# Patient Record
Sex: MALE | Race: WHITE | NOT HISPANIC OR LATINO | Employment: FULL TIME | URBAN - METROPOLITAN AREA
[De-identification: names, ages, dates, MRNs, and addresses within clinical notes are randomized per-mention and may not be internally consistent; named-entity substitution may affect disease eponyms.]

---

## 2020-01-28 ENCOUNTER — OFFICE VISIT (OUTPATIENT)
Dept: FAMILY MEDICINE CLINIC | Facility: CLINIC | Age: 25
End: 2020-01-28

## 2020-01-28 VITALS
SYSTOLIC BLOOD PRESSURE: 130 MMHG | DIASTOLIC BLOOD PRESSURE: 84 MMHG | HEART RATE: 87 BPM | OXYGEN SATURATION: 98 % | HEIGHT: 69 IN | WEIGHT: 189 LBS | BODY MASS INDEX: 27.99 KG/M2

## 2020-01-28 DIAGNOSIS — Q23.1 BICUSPID AORTIC VALVE: ICD-10-CM

## 2020-01-28 DIAGNOSIS — Z00.00 PREVENTATIVE HEALTH CARE: ICD-10-CM

## 2020-01-28 DIAGNOSIS — F32.A DEPRESSION, UNSPECIFIED DEPRESSION TYPE: Primary | ICD-10-CM

## 2020-01-28 PROBLEM — Q23.81 BICUSPID AORTIC VALVE: Status: ACTIVE | Noted: 2020-01-28

## 2020-01-28 PROCEDURE — 99203 OFFICE O/P NEW LOW 30 MIN: CPT | Performed by: FAMILY MEDICINE

## 2020-01-28 RX ORDER — BUPROPION HYDROCHLORIDE 150 MG/1
150 TABLET ORAL DAILY
Qty: 30 TABLET | Refills: 2 | OUTPATIENT
Start: 2020-01-28 | End: 2021-04-13

## 2020-01-28 NOTE — PROGRESS NOTES
"Subjective   Augustin Frederick is a 24 y.o. male.     Chief Complaint   Patient presents with   • Establish Care     anxiety, depression        History of Present Illness     Acute complaints:  Augustin Frederick is a 24 y.o. male who presents today to establish care.  He has acute concern for \"mental health issues.\" He reports difficulty with emotions for about 10 years or so. It sometimes causes problems within relationships. He has noticed decreased energy last couple years. Been at [employer] for about 3 years, does not feel like it has affected his work. He typically sleeps 8:30am-4:30am. Lives with gf of 11 years and two kids daughter 5 son 4. When he was a lot younger he was on something for ADHD. Social alcohol use if ever, nonsmoker. Exercise is limited to work, he used to run more but has quit some over the years due to left ankle. Diet is varied but poor.    This patient is accompanied by their self who contributes to the history of their care.    The following portions of the patient's history were reviewed and updated as appropriate: allergies, current medications, past family history, past medical history, past social history, past surgical history and problem list.    Active Ambulatory Problems     Diagnosis Date Noted   • Bicuspid aortic valve 01/28/2020   • Depression 01/28/2020     Resolved Ambulatory Problems     Diagnosis Date Noted   • No Resolved Ambulatory Problems     Past Medical History:   Diagnosis Date   • Aortic valve, bicuspid      Past Surgical History:   Procedure Laterality Date   • ANKLE SURGERY     • LEG SURGERY       Family History   Problem Relation Age of Onset   • Heart disease Father    • Heart disease Paternal Grandfather      Social History     Socioeconomic History   • Marital status: Single     Spouse name: Not on file   • Number of children: Not on file   • Years of education: Not on file   • Highest education level: Not on file   Tobacco Use   • Smoking status: Never " "Smoker   • Smokeless tobacco: Never Used   Substance and Sexual Activity   • Alcohol use: Yes     Comment: occasionally    • Drug use: No   • Sexual activity: Defer       Review of Systems  See new patient paperwork scanned into chart  General ROS: negative for - chills, fever or night sweats  Cardiovascular ROS: no chest pain or dyspnea on exertion  Gastrointestinal ROS: no abdominal pain, change in bowel habits, or black or bloody stools  Genito-Urinary ROS: no trouble voiding or gross hematuria    Objective   Blood pressure 130/84, pulse 87, height 175.3 cm (69\"), weight 85.7 kg (189 lb), SpO2 98 %.  Nursing note reviewed  Physical Exam  Const: NAD, A&Ox4, Pleasant, Cooperative  Eyes: EOMI, no conjunctivitis  ENT: No nasal discharge present, neck supple  Cardiac: Regular rate and rhythm, no cyanosis  Resp: Respiratory rate within normal limits, no increased work of breathing, no audible wheezing or retractions noted  GI: No distention or ascites  MSK: Motor and sensation grossly intact in bilateral upper extremities  Neurologic: CN II-XII grossly intact  Psych: Appropriate mood and behavior.  Skin: Warm, dry  PHQ-9 Depression Screening  Little interest or pleasure in doing things? 3   Feeling down, depressed, or hopeless? 1   Trouble falling or staying asleep, or sleeping too much? 2   Feeling tired or having little energy? 3   Poor appetite or overeating? 3   Feeling bad about yourself - or that you are a failure or have let yourself or your family down? 0   Trouble concentrating on things, such as reading the newspaper or watching television? 0   Moving or speaking so slowly that other people could have noticed? Or the opposite - being so fidgety or restless that you have been moving around a lot more than usual? 0   Thoughts that you would be better off dead, or of hurting yourself in some way? 0   PHQ-9 Total Score 12   If you checked off any problems, how difficult have these problems made it for you to do " your work, take care of things at home, or get along with other people? Somewhat difficult     Procedures  Assessment/Plan   Problem List Items Addressed This Visit        Cardiovascular and Mediastinum    Bicuspid aortic valve       Other    Depression - Primary    Overview     PHQ 12, somewhat difficult.  We will check labs today, if no underlying organic cause rec Wellbutrin XL 150mg & would recommend psych, exercise, diet.         Relevant Medications    buPROPion XL (WELLBUTRIN XL) 150 MG 24 hr tablet    Other Relevant Orders    Testosterone    TSH Rfx On Abnormal To Free T4    Vitamin D 25 Hydroxy    Vitamin B12    CBC (No Diff)    Comprehensive Metabolic Panel    Hemoglobin A1c    Lipid Panel    Ambulatory Referral to Psychology      Other Visit Diagnoses     Preventative health care        Relevant Orders    Testosterone    TSH Rfx On Abnormal To Free T4    Vitamin D 25 Hydroxy    Vitamin B12    CBC (No Diff)    Comprehensive Metabolic Panel    Hemoglobin A1c    Lipid Panel          See patient diagnoses and orders along with patient instructions for assessment, plan, and changes to care for patient.    We will plan to obtain previous records both for chronic preventative care as well as those related to the current episode of care.  Any records that the patient brought with him today were reviewed personally by me during the visit today and will be scanned into the chart for posterity.    There are no Patient Instructions on file for this visit.    Return in about 2 weeks (around 2/11/2020) for (with PHQ9);, Annual.    Ambulatory progress note signed and attested to by Donny Arita D.O.

## 2020-02-04 ENCOUNTER — LAB (OUTPATIENT)
Dept: LAB | Facility: HOSPITAL | Age: 25
End: 2020-02-04

## 2020-02-04 DIAGNOSIS — F32.A DEPRESSION, UNSPECIFIED DEPRESSION TYPE: ICD-10-CM

## 2020-02-04 DIAGNOSIS — Z00.00 PREVENTATIVE HEALTH CARE: ICD-10-CM

## 2020-02-04 LAB
25(OH)D3 SERPL-MCNC: 23.4 NG/ML (ref 30–100)
ALBUMIN SERPL-MCNC: 5.1 G/DL (ref 3.5–5.2)
ALBUMIN/GLOB SERPL: 2.1 G/DL
ALP SERPL-CCNC: 52 U/L (ref 39–117)
ALT SERPL W P-5'-P-CCNC: 22 U/L (ref 1–41)
ANION GAP SERPL CALCULATED.3IONS-SCNC: 14.4 MMOL/L (ref 5–15)
AST SERPL-CCNC: 25 U/L (ref 1–40)
BILIRUB SERPL-MCNC: 0.2 MG/DL (ref 0.2–1.2)
BUN BLD-MCNC: 12 MG/DL (ref 6–20)
BUN/CREAT SERPL: 12.2 (ref 7–25)
CALCIUM SPEC-SCNC: 9.9 MG/DL (ref 8.6–10.5)
CHLORIDE SERPL-SCNC: 100 MMOL/L (ref 98–107)
CHOLEST SERPL-MCNC: 162 MG/DL (ref 0–200)
CO2 SERPL-SCNC: 25.6 MMOL/L (ref 22–29)
CREAT BLD-MCNC: 0.98 MG/DL (ref 0.76–1.27)
DEPRECATED RDW RBC AUTO: 40.8 FL (ref 37–54)
ERYTHROCYTE [DISTWIDTH] IN BLOOD BY AUTOMATED COUNT: 12.5 % (ref 12.3–15.4)
GFR SERPL CREATININE-BSD FRML MDRD: 94 ML/MIN/1.73
GLOBULIN UR ELPH-MCNC: 2.4 GM/DL
GLUCOSE BLD-MCNC: 84 MG/DL (ref 65–99)
HBA1C MFR BLD: 5.1 % (ref 4.8–5.6)
HCT VFR BLD AUTO: 42.6 % (ref 37.5–51)
HDLC SERPL-MCNC: 30 MG/DL (ref 40–60)
HGB BLD-MCNC: 15.1 G/DL (ref 13–17.7)
LDLC SERPL CALC-MCNC: 78 MG/DL (ref 0–100)
LDLC/HDLC SERPL: 2.59 {RATIO}
MCH RBC QN AUTO: 31.7 PG (ref 26.6–33)
MCHC RBC AUTO-ENTMCNC: 35.4 G/DL (ref 31.5–35.7)
MCV RBC AUTO: 89.5 FL (ref 79–97)
PLATELET # BLD AUTO: 201 10*3/MM3 (ref 140–450)
PMV BLD AUTO: 10.7 FL (ref 6–12)
POTASSIUM BLD-SCNC: 4.2 MMOL/L (ref 3.5–5.2)
PROT SERPL-MCNC: 7.5 G/DL (ref 6–8.5)
RBC # BLD AUTO: 4.76 10*6/MM3 (ref 4.14–5.8)
SODIUM BLD-SCNC: 140 MMOL/L (ref 136–145)
TRIGL SERPL-MCNC: 272 MG/DL (ref 0–150)
TSH SERPL DL<=0.05 MIU/L-ACNC: 1.41 UIU/ML (ref 0.27–4.2)
VIT B12 BLD-MCNC: 365 PG/ML (ref 211–946)
VLDLC SERPL-MCNC: 54.4 MG/DL (ref 5–40)
WBC NRBC COR # BLD: 6.21 10*3/MM3 (ref 3.4–10.8)

## 2020-02-04 PROCEDURE — 83036 HEMOGLOBIN GLYCOSYLATED A1C: CPT

## 2020-02-04 PROCEDURE — 82306 VITAMIN D 25 HYDROXY: CPT

## 2020-02-04 PROCEDURE — 84403 ASSAY OF TOTAL TESTOSTERONE: CPT

## 2020-02-04 PROCEDURE — 85027 COMPLETE CBC AUTOMATED: CPT

## 2020-02-04 PROCEDURE — 84443 ASSAY THYROID STIM HORMONE: CPT

## 2020-02-04 PROCEDURE — 80061 LIPID PANEL: CPT

## 2020-02-04 PROCEDURE — 80053 COMPREHEN METABOLIC PANEL: CPT

## 2020-02-04 PROCEDURE — 82607 VITAMIN B-12: CPT

## 2020-02-05 LAB — TESTOST SERPL-MCNC: 470 NG/DL (ref 249–836)

## 2020-02-11 ENCOUNTER — OFFICE VISIT (OUTPATIENT)
Dept: FAMILY MEDICINE CLINIC | Facility: CLINIC | Age: 25
End: 2020-02-11

## 2020-02-11 VITALS
SYSTOLIC BLOOD PRESSURE: 130 MMHG | HEART RATE: 90 BPM | OXYGEN SATURATION: 99 % | HEIGHT: 69 IN | WEIGHT: 194 LBS | DIASTOLIC BLOOD PRESSURE: 90 MMHG | BODY MASS INDEX: 28.73 KG/M2

## 2020-02-11 DIAGNOSIS — Z00.00 PREVENTATIVE HEALTH CARE: Primary | ICD-10-CM

## 2020-02-11 DIAGNOSIS — F32.A DEPRESSION, UNSPECIFIED DEPRESSION TYPE: ICD-10-CM

## 2020-02-11 DIAGNOSIS — E78.2 MODERATE MIXED HYPERLIPIDEMIA NOT REQUIRING STATIN THERAPY: ICD-10-CM

## 2020-02-11 DIAGNOSIS — E55.9 VITAMIN D DEFICIENCY: ICD-10-CM

## 2020-02-11 PROCEDURE — 99395 PREV VISIT EST AGE 18-39: CPT | Performed by: FAMILY MEDICINE

## 2020-02-11 RX ORDER — VITAMIN B COMPLEX
1000 TABLET ORAL DAILY
Qty: 90 TABLET | Refills: 3 | Status: SHIPPED | OUTPATIENT
Start: 2020-02-11 | End: 2022-09-29 | Stop reason: SDUPTHER

## 2020-02-11 RX ORDER — CHLORAL HYDRATE 500 MG
1000 CAPSULE ORAL
Qty: 90 CAPSULE | Refills: 3 | Status: SHIPPED | OUTPATIENT
Start: 2020-02-11 | End: 2022-09-29 | Stop reason: SDUPTHER

## 2020-02-11 NOTE — PROGRESS NOTES
"Subjective   Augustin Frederick is a 24 y.o. male.     Chief Complaint   Patient presents with   • Annual Exam       History of Present Illness     Acute complaints:  Augustin Frederick is a 24 y.o. male who presents today to establish care.  He has acute concern for \"mental health issues.\" He reports difficulty with emotions for about 10 years or so. It sometimes causes problems within relationships. He has noticed decreased energy last couple years. Been at [employer] for about 3 years, does not feel like it has affected his work. He typically sleeps 8:30am-4:30am. Lives with gf of 11 years and two kids daughter 5 son 4. When he was a lot younger he was on something for ADHD. Social alcohol use if ever, nonsmoker. Exercise is limited to work, he used to run more but has quit some over the years due to left ankle. Diet is varied but poor.    This patient is accompanied by their self who contributes to the history of their care.    The following portions of the patient's history were reviewed and updated as appropriate: allergies, current medications, past family history, past medical history, past social history, past surgical history and problem list.    Active Ambulatory Problems     Diagnosis Date Noted   • Bicuspid aortic valve 01/28/2020   • Depression 01/28/2020     Resolved Ambulatory Problems     Diagnosis Date Noted   • No Resolved Ambulatory Problems     Past Medical History:   Diagnosis Date   • Aortic valve, bicuspid      Past Surgical History:   Procedure Laterality Date   • ANKLE SURGERY     • LEG SURGERY       Family History   Problem Relation Age of Onset   • Heart disease Father    • Heart disease Paternal Grandfather      Social History     Socioeconomic History   • Marital status: Single     Spouse name: Not on file   • Number of children: Not on file   • Years of education: Not on file   • Highest education level: Not on file   Tobacco Use   • Smoking status: Never Smoker   • Smokeless " "tobacco: Never Used   Substance and Sexual Activity   • Alcohol use: Yes     Comment: occasionally    • Drug use: No   • Sexual activity: Defer       Review of Systems  See new patient paperwork scanned into chart  General ROS: negative for - chills, fever or night sweats  Cardiovascular ROS: no chest pain or dyspnea on exertion  Gastrointestinal ROS: no abdominal pain, change in bowel habits, or black or bloody stools  Genito-Urinary ROS: no trouble voiding or gross hematuria    Objective   Blood pressure 130/90, pulse 90, height 175.3 cm (69.02\"), weight 88 kg (194 lb), SpO2 99 %.  Nursing note reviewed  Physical Exam  Const: NAD, A&Ox4, Pleasant, Cooperative  Eyes: EOMI, no conjunctivitis  ENT: No nasal discharge present, neck supple  Cardiac: Regular rate and rhythm, no cyanosis  Resp: Respiratory rate within normal limits, no increased work of breathing, no audible wheezing or retractions noted  GI: No distention or ascites  MSK: Motor and sensation grossly intact in bilateral upper extremities  Neurologic: CN II-XII grossly intact  Psych: Appropriate mood and behavior.  Skin: Warm, dry  PHQ-9 Depression Screening  Little interest or pleasure in doing things? 1   Feeling down, depressed, or hopeless? 0   Trouble falling or staying asleep, or sleeping too much? 1   Feeling tired or having little energy? 3   Poor appetite or overeating? 1   Feeling bad about yourself - or that you are a failure or have let yourself or your family down? 0   Trouble concentrating on things, such as reading the newspaper or watching television? 0   Moving or speaking so slowly that other people could have noticed? Or the opposite - being so fidgety or restless that you have been moving around a lot more than usual? 0   Thoughts that you would be better off dead, or of hurting yourself in some way? 0   PHQ-9 Total Score 6   If you checked off any problems, how difficult have these problems made it for you to do your work, take care " of things at home, or get along with other people? Not difficult at all     Procedures  Assessment/Plan   Problem List Items Addressed This Visit        Other    Depression    Overview     PHQ 12, somewhat difficult.  We will check labs today, if no underlying organic cause rec Wellbutrin XL 150mg & would recommend psych, exercise, diet.           Other Visit Diagnoses     Preventative health care    -  Primary    Moderate mixed hyperlipidemia not requiring statin therapy        Relevant Medications    Omega-3 Fatty Acids (FISH OIL) 1000 MG capsule capsule    Other Relevant Orders    Lipid Panel    Comprehensive Metabolic Panel    Vitamin D deficiency        Relevant Medications    Cholecalciferol (VITAMIN D) 25 MCG (1000 UT) tablet    Other Relevant Orders    Vitamin D 25 Hydroxy          See patient diagnoses and orders along with patient instructions for assessment, plan, and changes to care for patient.    The preventative exam has been reviewed in detail.  The patient has been fully counseled on preventative guidelines for vaccines, cancer screenings, and other health maintenance needs. The patient was counseled on maintaining a lifestyle to promote good health and to minimize chronic diseases.  The patient has been assisted with scheduling healthcare procedures for the coming year and given a written document outlining these recommendations. Age-appropriate screening measures have been ordered for the patient today as indicated above.    Patient Instructions   Cholesterol    Cholesterol is a fat. Your body needs a small amount of cholesterol. Cholesterol (plaque) may build up in your blood vessels (arteries). That makes you more likely to have a heart attack or stroke.  You cannot feel your cholesterol level. Having a blood test is the only way to find out if your level is high. Keep your test results. Work with your doctor to keep your cholesterol at a good level.  What do the results mean?  · Total  cholesterol is how much cholesterol is in your blood.  · LDL is bad cholesterol. This is the type that can build up. Try to have low LDL.  · HDL is good cholesterol. It cleans your blood vessels and carries LDL away. Try to have high HDL.  · Triglycerides are fat that the body can store or burn for energy.  What are good levels of cholesterol?  · Total cholesterol below 200.  · LDL below 100 is good for people who have health risks. LDL below 70 is good for people who have very high risks.  · HDL above 40 is good. It is best to have HDL of 60 or higher.  · Triglycerides below 150.  How can I lower my cholesterol?  Diet  Follow your diet program as told by your doctor.  · Choose fish, white meat chicken, or turkey that is roasted or baked. Try not to eat red meat, fried foods, sausage, or lunch meats.  · Eat lots of fresh fruits and vegetables.  · Choose whole grains, beans, pasta, potatoes, and cereals.  · Choose olive oil, corn oil, or canola oil. Only use small amounts.  · Try not to eat butter, mayonnaise, shortening, or palm kernel oils.  · Try not to eat foods with trans fats.  · Choose low-fat or nonfat dairy foods.  ? Drink skim or nonfat milk.  ? Eat low-fat or nonfat yogurt and cheeses.  ? Try not to drink whole milk or cream.  ? Try not to eat ice cream, egg yolks, or full-fat cheeses.  · Healthy desserts include gian food cake, don snaps, animal crackers, hard candy, popsicles, and low-fat or nonfat frozen yogurt. Try not to eat pastries, cakes, pies, and cookies.    Exercise  Follow your exercise program as told by your doctor.  · Be more active. Try gardening, walking, and taking the stairs.  · Ask your doctor about ways that you can be more active.  Medicine  · Take over-the-counter and prescription medicines only as told by your doctor.  This information is not intended to replace advice given to you by your health care provider. Make sure you discuss any questions you have with your health care  provider.  Document Released: 03/16/2010 Document Revised: 07/19/2017 Document Reviewed: 06/29/2017  [employer] Patient Education © 2019 [employer]        Return in about 6 months (around 8/11/2020) for (with PHQ9);, (fasting blood work 1 week prior to appt).    Ambulatory progress note signed and attested to by Donny Arita D.O.

## 2020-02-11 NOTE — PATIENT INSTRUCTIONS
Cholesterol    Cholesterol is a fat. Your body needs a small amount of cholesterol. Cholesterol (plaque) may build up in your blood vessels (arteries). That makes you more likely to have a heart attack or stroke.  You cannot feel your cholesterol level. Having a blood test is the only way to find out if your level is high. Keep your test results. Work with your doctor to keep your cholesterol at a good level.  What do the results mean?  · Total cholesterol is how much cholesterol is in your blood.  · LDL is bad cholesterol. This is the type that can build up. Try to have low LDL.  · HDL is good cholesterol. It cleans your blood vessels and carries LDL away. Try to have high HDL.  · Triglycerides are fat that the body can store or burn for energy.  What are good levels of cholesterol?  · Total cholesterol below 200.  · LDL below 100 is good for people who have health risks. LDL below 70 is good for people who have very high risks.  · HDL above 40 is good. It is best to have HDL of 60 or higher.  · Triglycerides below 150.  How can I lower my cholesterol?  Diet  Follow your diet program as told by your doctor.  · Choose fish, white meat chicken, or turkey that is roasted or baked. Try not to eat red meat, fried foods, sausage, or lunch meats.  · Eat lots of fresh fruits and vegetables.  · Choose whole grains, beans, pasta, potatoes, and cereals.  · Choose olive oil, corn oil, or canola oil. Only use small amounts.  · Try not to eat butter, mayonnaise, shortening, or palm kernel oils.  · Try not to eat foods with trans fats.  · Choose low-fat or nonfat dairy foods.  ? Drink skim or nonfat milk.  ? Eat low-fat or nonfat yogurt and cheeses.  ? Try not to drink whole milk or cream.  ? Try not to eat ice cream, egg yolks, or full-fat cheeses.  · Healthy desserts include gian food cake, don snaps, animal crackers, hard candy, popsicles, and low-fat or nonfat frozen yogurt. Try not to eat pastries, cakes, pies, and  cookies.    Exercise  Follow your exercise program as told by your doctor.  · Be more active. Try gardening, walking, and taking the stairs.  · Ask your doctor about ways that you can be more active.  Medicine  · Take over-the-counter and prescription medicines only as told by your doctor.  This information is not intended to replace advice given to you by your health care provider. Make sure you discuss any questions you have with your health care provider.  Document Released: 03/16/2010 Document Revised: 07/19/2017 Document Reviewed: 06/29/2017  [employer] Interactive Patient Education © 2019 [employer]

## 2021-04-12 ENCOUNTER — HOSPITAL ENCOUNTER (EMERGENCY)
Facility: HOSPITAL | Age: 26
Discharge: HOME | End: 2021-04-13
Attending: EMERGENCY MEDICINE | Admitting: EMERGENCY MEDICINE

## 2021-04-12 DIAGNOSIS — F48.9: Primary | ICD-10-CM

## 2021-04-12 DIAGNOSIS — R41.840 POOR CONCENTRATION: ICD-10-CM

## 2021-04-12 DIAGNOSIS — Z86.59 HISTORY OF DEPRESSION: ICD-10-CM

## 2021-04-12 PROCEDURE — 99283 EMERGENCY DEPT VISIT LOW MDM: CPT

## 2021-04-13 ENCOUNTER — APPOINTMENT (OUTPATIENT)
Dept: GENERAL RADIOLOGY | Facility: HOSPITAL | Age: 26
End: 2021-04-13

## 2021-04-13 VITALS
TEMPERATURE: 98.1 F | OXYGEN SATURATION: 100 % | BODY MASS INDEX: 28.63 KG/M2 | HEIGHT: 70 IN | SYSTOLIC BLOOD PRESSURE: 116 MMHG | RESPIRATION RATE: 18 BRPM | WEIGHT: 200 LBS | DIASTOLIC BLOOD PRESSURE: 67 MMHG | HEART RATE: 114 BPM

## 2021-04-13 LAB
ALBUMIN SERPL-MCNC: 5.3 G/DL (ref 3.5–5.2)
ALBUMIN/GLOB SERPL: 2.1 G/DL
ALP SERPL-CCNC: 62 U/L (ref 39–117)
ALT SERPL W P-5'-P-CCNC: 22 U/L (ref 1–41)
ANION GAP SERPL CALCULATED.3IONS-SCNC: 11 MMOL/L (ref 5–15)
AST SERPL-CCNC: 28 U/L (ref 1–40)
BASOPHILS # BLD AUTO: 0.03 10*3/MM3 (ref 0–0.2)
BASOPHILS NFR BLD AUTO: 0.4 % (ref 0–1.5)
BILIRUB SERPL-MCNC: 0.4 MG/DL (ref 0–1.2)
BUN SERPL-MCNC: 12 MG/DL (ref 6–20)
BUN/CREAT SERPL: 14.1 (ref 7–25)
CALCIUM SPEC-SCNC: 10.2 MG/DL (ref 8.6–10.5)
CHLORIDE SERPL-SCNC: 103 MMOL/L (ref 98–107)
CO2 SERPL-SCNC: 26 MMOL/L (ref 22–29)
CREAT SERPL-MCNC: 0.85 MG/DL (ref 0.76–1.27)
DEPRECATED RDW RBC AUTO: 40.4 FL (ref 37–54)
EOSINOPHIL # BLD AUTO: 0.02 10*3/MM3 (ref 0–0.4)
EOSINOPHIL NFR BLD AUTO: 0.3 % (ref 0.3–6.2)
ERYTHROCYTE [DISTWIDTH] IN BLOOD BY AUTOMATED COUNT: 12.2 % (ref 12.3–15.4)
GFR SERPL CREATININE-BSD FRML MDRD: 110 ML/MIN/1.73
GLOBULIN UR ELPH-MCNC: 2.5 GM/DL
GLUCOSE SERPL-MCNC: 90 MG/DL (ref 65–99)
HCT VFR BLD AUTO: 47.8 % (ref 37.5–51)
HGB BLD-MCNC: 16.1 G/DL (ref 13–17.7)
IMM GRANULOCYTES # BLD AUTO: 0.02 10*3/MM3 (ref 0–0.05)
IMM GRANULOCYTES NFR BLD AUTO: 0.3 % (ref 0–0.5)
LYMPHOCYTES # BLD AUTO: 2.31 10*3/MM3 (ref 0.7–3.1)
LYMPHOCYTES NFR BLD AUTO: 29.1 % (ref 19.6–45.3)
MAGNESIUM SERPL-MCNC: 2 MG/DL (ref 1.6–2.6)
MCH RBC QN AUTO: 31 PG (ref 26.6–33)
MCHC RBC AUTO-ENTMCNC: 33.7 G/DL (ref 31.5–35.7)
MCV RBC AUTO: 91.9 FL (ref 79–97)
MONOCYTES # BLD AUTO: 0.57 10*3/MM3 (ref 0.1–0.9)
MONOCYTES NFR BLD AUTO: 7.2 % (ref 5–12)
NEUTROPHILS NFR BLD AUTO: 4.98 10*3/MM3 (ref 1.7–7)
NEUTROPHILS NFR BLD AUTO: 62.7 % (ref 42.7–76)
NRBC BLD AUTO-RTO: 0 /100 WBC (ref 0–0.2)
PLATELET # BLD AUTO: 233 10*3/MM3 (ref 140–450)
PMV BLD AUTO: 10 FL (ref 6–12)
POTASSIUM SERPL-SCNC: 4.3 MMOL/L (ref 3.5–5.2)
PROT SERPL-MCNC: 7.8 G/DL (ref 6–8.5)
RBC # BLD AUTO: 5.2 10*6/MM3 (ref 4.14–5.8)
SODIUM SERPL-SCNC: 140 MMOL/L (ref 136–145)
TSH SERPL DL<=0.05 MIU/L-ACNC: 0.76 UIU/ML (ref 0.27–4.2)
WBC # BLD AUTO: 7.93 10*3/MM3 (ref 3.4–10.8)

## 2021-04-13 PROCEDURE — 83735 ASSAY OF MAGNESIUM: CPT | Performed by: PHYSICIAN ASSISTANT

## 2021-04-13 PROCEDURE — 85025 COMPLETE CBC W/AUTO DIFF WBC: CPT | Performed by: PHYSICIAN ASSISTANT

## 2021-04-13 PROCEDURE — 80053 COMPREHEN METABOLIC PANEL: CPT | Performed by: PHYSICIAN ASSISTANT

## 2021-04-13 PROCEDURE — 84443 ASSAY THYROID STIM HORMONE: CPT | Performed by: PHYSICIAN ASSISTANT

## 2021-04-13 PROCEDURE — 93005 ELECTROCARDIOGRAM TRACING: CPT | Performed by: PHYSICIAN ASSISTANT

## 2021-04-13 PROCEDURE — 71045 X-RAY EXAM CHEST 1 VIEW: CPT

## 2021-04-13 NOTE — ED PROVIDER NOTES
"Subjective   This is a 25-year-old male that presents to the ER with 3-day history of some thought disorder.  Patient says he has been over analyzing everything and thoughts are \"all over the place and scattered\".  Patient also reports trouble concentrating.  He denies any visual or auditory hallucinations.  He denies any suicidal or homicidal ideations.  Patient has history of depression, but this is stable at this time.  He works at [employer] and has no trouble performing his job duties.  He also is  with 2 children and says that his relationships are good and his children and him get along very well.  Patient reports that he slept approximately 11 hours last night, but the night prior he did not sleep well.  He denies any longstanding history of insomnia.  Patient also says that he does not eat and drink that well.  He gets busy at work and does not feel like he takes good care of himself.  He denies alcohol or drug use.  He does report smoking increased cigarettes in the last couple weeks due to increased stress and anxiety.  Patient denies any increased caffeine intake or OTC herbal supplements.  Patient denies any significant past medical history other than depression.  He is not followed for this and has no PCP.  He has taken Wellbutrin in the past but is not taking it at present.  Patient wanted to be evaluated for the above symptoms.  He also reports symptoms of mild paranoia, but is adamant that he does not feel like anyone is out to get him.  He simply reports being paranoid at times.  Patient says that he was raised by his paternal grandmother.  He believes that his mother may have had mental illness.  He also says his father more than likely had mental illness, and patient's brother killed his father out of anger.  Patient denies any known family history of schizophrenia.      History provided by:  Patient  Psychiatric Evaluation  Location:  History of depression.  The last 3 days, increased manic " state.  Over-analyzing everything and thoughts are all over the place. No suicidal or homicidal ideations.   Duration:  3 days  Timing:  Constant  Context:  Increased thinking and over-analyzing everything.  Thoughts are all over the place and pt feels scattered.  No suicial or homicidal ideations.  No insomnia. No hallucinations.  Relieved by:  Nothing  Worsened by:  Nothing  Ineffective treatments:  None tried  Associated symptoms: no abdominal pain, no chest pain, no congestion, no cough, no diarrhea, no ear pain, no fatigue, no fever, no headaches, no loss of consciousness, no myalgias, no nausea, no rhinorrhea, no shortness of breath, no vomiting and no wheezing    Risk factors:  Pt works at [employer].  About 1 month ago, he switched from day shift to second shift. Pt was raised by his paternal GM.  He believes that his mother had mental illness.  His brother killed his father and his father may also have had mental illness.      Review of Systems   Constitutional: Negative.  Negative for appetite change, chills, diaphoresis, fatigue and fever.   HENT: Negative.  Negative for congestion, ear pain, postnasal drip, rhinorrhea, sinus pressure and sinus pain.    Respiratory: Negative.  Negative for cough, chest tightness, shortness of breath and wheezing.    Cardiovascular: Negative for chest pain.   Gastrointestinal: Negative for abdominal pain, diarrhea, nausea and vomiting.   Genitourinary: Negative.    Musculoskeletal: Negative.  Negative for back pain and myalgias.   Neurological: Negative.  Negative for dizziness, loss of consciousness, syncope, speech difficulty and headaches.   Psychiatric/Behavioral: Positive for decreased concentration. Negative for agitation, confusion, hallucinations, self-injury, sleep disturbance (Pt slept 11 hours last night, but night prior he only got a few hours.) and suicidal ideas.        Thoughts are all over the place.  Over-analyzing everything.  Trouble concentrating at work.   Pt works at [employer].   All other systems reviewed and are negative.      Past Medical History:   Diagnosis Date   • Aortic valve, bicuspid        No Known Allergies    Past Surgical History:   Procedure Laterality Date   • ANKLE SURGERY     • LEG SURGERY         Family History   Problem Relation Age of Onset   • Heart disease Father    • Heart disease Paternal Grandfather        Social History     Socioeconomic History   • Marital status:      Spouse name: Not on file   • Number of children: Not on file   • Years of education: Not on file   • Highest education level: Not on file   Tobacco Use   • Smoking status: Never Smoker   • Smokeless tobacco: Never Used   Substance and Sexual Activity   • Alcohol use: Yes     Comment: occasionally    • Drug use: No   • Sexual activity: Defer           Objective   Physical Exam  Vitals and nursing note reviewed.   Constitutional:       Appearance: Normal appearance.   HENT:      Head: Normocephalic and atraumatic.      Right Ear: Tympanic membrane normal.      Left Ear: Tympanic membrane normal.      Nose: Nose normal.      Mouth/Throat:      Mouth: Mucous membranes are moist.      Pharynx: Oropharynx is clear.   Eyes:      Extraocular Movements: Extraocular movements intact.      Conjunctiva/sclera: Conjunctivae normal.      Pupils: Pupils are equal, round, and reactive to light.   Cardiovascular:      Rate and Rhythm: Regular rhythm. Tachycardia present.  No extrasystoles are present.     Pulses: Normal pulses.      Heart sounds: Normal heart sounds.      Comments: Regular rate and rhythm.  No ectopy.  No pedal edema to lower extremities.  Pulmonary:      Effort: Pulmonary effort is normal.      Breath sounds: Normal breath sounds.      Comments: Lungs are clear to auscultation bilaterally.  Abdominal:      General: Bowel sounds are normal. There is no distension.      Palpations: Abdomen is soft.      Tenderness: There is no abdominal tenderness. There is no right CVA  tenderness, left CVA tenderness, guarding or rebound.      Comments: Abdomen soft and nontender.  No distention.   Musculoskeletal:         General: Normal range of motion.      Cervical back: Normal range of motion and neck supple.   Skin:     General: Skin is warm and dry.   Neurological:      General: No focal deficit present.      Mental Status: He is alert.      Cranial Nerves: Cranial nerves are intact.      Sensory: Sensation is intact.      Motor: Motor function is intact.      Coordination: Coordination is intact.      Comments: Alert and oriented x3.  Neuro intact and nonfocal.   Psychiatric:         Mood and Affect: Mood normal. Affect is not flat.         Behavior: Behavior is hyperactive. Behavior is not agitated or aggressive.         Thought Content: Thought content is not paranoid or delusional. Thought content does not include homicidal or suicidal ideation. Thought content does not include homicidal or suicidal plan.         Cognition and Memory: Cognition normal.         Judgment: Judgment normal.      Comments: Good eye contact.  Patient friendly and talkative.  Hyperverbal.  Appears hyperactive.  Normal speech.  No paranoia.  No hallucinations, either auditory or visual.  Judgment is normal.         Procedures           ED Course  ED Course as of Apr 13 0322   Tue Apr 13, 2021   0240 EKG shows sinus tachycardia.  CBC and chemistries were within normal limits.  TSH was normal at 0.763.  Magnesium level is 2.0.  CXR was also within normal limits. Patient refused to provide urine specimen.  He is anxious about leaving and says that he has to be at work before long and has to go home and get sleep.  He denies any suicidal or homicidal ideations.  He is not actively psychotic.  He is hyperverbal.  I recommend a follow-up at the [facility] for outpatient evaluation.  Patient verbalized understanding.  Vital signs and exam are stable.  Discussed the case with Dr. Lawson, ER attending physician.  Patient is to  return if any worsening symptoms.    [FC]      ED Course User Index  [FC] Lucrecia May, LINDA            Recent Results (from the past 24 hour(s))   Comprehensive Metabolic Panel    Collection Time: 04/13/21 12:49 AM    Specimen: Blood   Result Value Ref Range    Glucose 90 65 - 99 mg/dL    BUN 12 6 - 20 mg/dL    Creatinine 0.85 0.76 - 1.27 mg/dL    Sodium 140 136 - 145 mmol/L    Potassium 4.3 3.5 - 5.2 mmol/L    Chloride 103 98 - 107 mmol/L    CO2 26.0 22.0 - 29.0 mmol/L    Calcium 10.2 8.6 - 10.5 mg/dL    Total Protein 7.8 6.0 - 8.5 g/dL    Albumin 5.30 (H) 3.50 - 5.20 g/dL    ALT (SGPT) 22 1 - 41 U/L    AST (SGOT) 28 1 - 40 U/L    Alkaline Phosphatase 62 39 - 117 U/L    Total Bilirubin 0.4 0.0 - 1.2 mg/dL    eGFR Non African Amer 110 >60 mL/min/1.73    Globulin 2.5 gm/dL    A/G Ratio 2.1 g/dL    BUN/Creatinine Ratio 14.1 7.0 - 25.0    Anion Gap 11.0 5.0 - 15.0 mmol/L   TSH    Collection Time: 04/13/21 12:49 AM    Specimen: Blood   Result Value Ref Range    TSH 0.763 0.270 - 4.200 uIU/mL   Magnesium    Collection Time: 04/13/21 12:49 AM    Specimen: Blood   Result Value Ref Range    Magnesium 2.0 1.6 - 2.6 mg/dL   CBC Auto Differential    Collection Time: 04/13/21 12:49 AM    Specimen: Blood   Result Value Ref Range    WBC 7.93 3.40 - 10.80 10*3/mm3    RBC 5.20 4.14 - 5.80 10*6/mm3    Hemoglobin 16.1 13.0 - 17.7 g/dL    Hematocrit 47.8 37.5 - 51.0 %    MCV 91.9 79.0 - 97.0 fL    MCH 31.0 26.6 - 33.0 pg    MCHC 33.7 31.5 - 35.7 g/dL    RDW 12.2 (L) 12.3 - 15.4 %    RDW-SD 40.4 37.0 - 54.0 fl    MPV 10.0 6.0 - 12.0 fL    Platelets 233 140 - 450 10*3/mm3    Neutrophil % 62.7 42.7 - 76.0 %    Lymphocyte % 29.1 19.6 - 45.3 %    Monocyte % 7.2 5.0 - 12.0 %    Eosinophil % 0.3 0.3 - 6.2 %    Basophil % 0.4 0.0 - 1.5 %    Immature Grans % 0.3 0.0 - 0.5 %    Neutrophils, Absolute 4.98 1.70 - 7.00 10*3/mm3    Lymphocytes, Absolute 2.31 0.70 - 3.10 10*3/mm3    Monocytes, Absolute 0.57 0.10 - 0.90 10*3/mm3    Eosinophils,  "Absolute 0.02 0.00 - 0.40 10*3/mm3    Basophils, Absolute 0.03 0.00 - 0.20 10*3/mm3    Immature Grans, Absolute 0.02 0.00 - 0.05 10*3/mm3    nRBC 0.0 0.0 - 0.2 /100 WBC     Note: In addition to lab results from this visit, the labs listed above may include labs taken at another facility or during a different encounter within the last 24 hours. Please correlate lab times with ED admission and discharge times for further clarification of the services performed during this visit.    XR Chest 1 View   Final Result   Negative chest.      Signer Name: Ismael Torres MD    Signed: 4/13/2021 1:06 AM    [email or web address]     [facility]        Vitals:    04/12/21 2144 04/12/21 2318 04/13/21 0000   BP: 146/85 124/91 116/67   BP Location: Left arm     Patient Position: Sitting     Pulse: 114     Resp: 18     Temp: 98.1 °F (36.7 °C)     TempSrc: Oral     SpO2: 100%     Weight: 90.7 kg (200 lb)     Height: 177.8 cm (70\")       Medications - No data to display  ECG/EMG Results (last 24 hours)     ** No results found for the last 24 hours. **        ECG 12 Lead                                               MDM    Final diagnoses:   Disorder of form of thought   Poor concentration   History of depression       ED Disposition  ED Disposition     ED Disposition Condition Comment    Discharge Stable           [facility]  [address]  [facility] [address] [phone]  Call in 1 day  Call in the morning for outpatient follow-up to be scheduled    [facility] Emergency Department  [address] [place] [id number] [phone]    If symptoms worsen         Medication List      Stop    buPROPion  MG 24 hr tablet  Commonly known as: Wellbutrin XL             Lucrecia May PA-C  04/13/21 0322    "

## 2021-04-13 NOTE — DISCHARGE INSTRUCTIONS
ER evaluation revealed normal CBC, chemistries, and thyroid studies.  Chest x-ray and EKG were also within normal limits.  Exam is stable, but patient is having trouble concentrating and thought disorder.  Recommend follow-up in the outpatient setting at the [facility].  Avoid alcohol, drugs, or increased caffeine.  Vital signs and exam are stable.  Return to the ER if any worsening symptoms.

## 2021-04-14 LAB
QT INTERVAL: 358 MS
QTC INTERVAL: 415 MS

## 2021-04-20 ENCOUNTER — LAB (OUTPATIENT)
Dept: LAB | Facility: HOSPITAL | Age: 26
End: 2021-04-20

## 2021-04-20 ENCOUNTER — HOSPITAL ENCOUNTER (OUTPATIENT)
Dept: CT IMAGING | Facility: HOSPITAL | Age: 26
Discharge: HOME | End: 2021-04-20

## 2021-04-20 ENCOUNTER — OFFICE VISIT (OUTPATIENT)
Dept: FAMILY MEDICINE CLINIC | Facility: CLINIC | Age: 26
End: 2021-04-20

## 2021-04-20 VITALS
DIASTOLIC BLOOD PRESSURE: 80 MMHG | HEIGHT: 70 IN | WEIGHT: 186.6 LBS | HEART RATE: 56 BPM | OXYGEN SATURATION: 98 % | SYSTOLIC BLOOD PRESSURE: 128 MMHG | BODY MASS INDEX: 26.71 KG/M2

## 2021-04-20 DIAGNOSIS — F31.81 BIPOLAR II DISORDER, MOST RECENT EPISODE HYPOMANIC (HCC): ICD-10-CM

## 2021-04-20 DIAGNOSIS — Z00.00 PREVENTATIVE HEALTH CARE: ICD-10-CM

## 2021-04-20 DIAGNOSIS — F30.8 HYPOMANIC EPISODE (HCC): ICD-10-CM

## 2021-04-20 DIAGNOSIS — E55.9 VITAMIN D DEFICIENCY: ICD-10-CM

## 2021-04-20 DIAGNOSIS — R45.1 AGITATION: ICD-10-CM

## 2021-04-20 DIAGNOSIS — F30.8 HYPOMANIC EPISODE (HCC): Primary | ICD-10-CM

## 2021-04-20 PROBLEM — F31.12 BIPOLAR 1 DISORDER WITH MODERATE MANIA (HCC): Status: ACTIVE | Noted: 2020-01-28

## 2021-04-20 LAB
BILIRUB UR QL STRIP: NEGATIVE
CLARITY UR: ABNORMAL
COLOR UR: ABNORMAL
DEPRECATED RDW RBC AUTO: 41.6 FL (ref 37–54)
ERYTHROCYTE [DISTWIDTH] IN BLOOD BY AUTOMATED COUNT: 12.6 % (ref 12.3–15.4)
GLUCOSE UR STRIP-MCNC: NEGATIVE MG/DL
HCT VFR BLD AUTO: 47.2 % (ref 37.5–51)
HGB BLD-MCNC: 16.3 G/DL (ref 13–17.7)
HGB UR QL STRIP.AUTO: NEGATIVE
KETONES UR QL STRIP: ABNORMAL
LEUKOCYTE ESTERASE UR QL STRIP.AUTO: NEGATIVE
MCH RBC QN AUTO: 31.7 PG (ref 26.6–33)
MCHC RBC AUTO-ENTMCNC: 34.5 G/DL (ref 31.5–35.7)
MCV RBC AUTO: 91.8 FL (ref 79–97)
NITRITE UR QL STRIP: NEGATIVE
PH UR STRIP.AUTO: 6.5 [PH] (ref 5–8)
PLATELET # BLD AUTO: 222 10*3/MM3 (ref 140–450)
PMV BLD AUTO: 10.6 FL (ref 6–12)
PROT UR QL STRIP: ABNORMAL
RBC # BLD AUTO: 5.14 10*6/MM3 (ref 4.14–5.8)
SP GR UR STRIP: 1.03 (ref 1–1.03)
UROBILINOGEN UR QL STRIP: ABNORMAL
WBC # BLD AUTO: 4.24 10*3/MM3 (ref 3.4–10.8)

## 2021-04-20 PROCEDURE — 84480 ASSAY TRIIODOTHYRONINE (T3): CPT

## 2021-04-20 PROCEDURE — 99214 OFFICE O/P EST MOD 30 MIN: CPT | Performed by: FAMILY MEDICINE

## 2021-04-20 PROCEDURE — 80053 COMPREHEN METABOLIC PANEL: CPT

## 2021-04-20 PROCEDURE — 82306 VITAMIN D 25 HYDROXY: CPT

## 2021-04-20 PROCEDURE — 85027 COMPLETE CBC AUTOMATED: CPT

## 2021-04-20 PROCEDURE — 81003 URINALYSIS AUTO W/O SCOPE: CPT

## 2021-04-20 PROCEDURE — 86141 C-REACTIVE PROTEIN HS: CPT

## 2021-04-20 PROCEDURE — 84443 ASSAY THYROID STIM HORMONE: CPT

## 2021-04-20 PROCEDURE — 70450 CT HEAD/BRAIN W/O DYE: CPT

## 2021-04-20 PROCEDURE — 84439 ASSAY OF FREE THYROXINE: CPT

## 2021-04-20 PROCEDURE — 80061 LIPID PANEL: CPT

## 2021-04-20 RX ORDER — OLANZAPINE 10 MG/1
TABLET ORAL
Qty: 18 TABLET | Refills: 0 | Status: SHIPPED | OUTPATIENT
Start: 2021-04-20 | End: 2021-05-06 | Stop reason: SDUPTHER

## 2021-04-20 RX ORDER — HYDROXYZINE HYDROCHLORIDE 25 MG/1
25 TABLET, FILM COATED ORAL 3 TIMES DAILY PRN
COMMUNITY
End: 2022-09-30

## 2021-04-21 ENCOUNTER — TELEPHONE (OUTPATIENT)
Dept: FAMILY MEDICINE CLINIC | Facility: CLINIC | Age: 26
End: 2021-04-21

## 2021-04-21 DIAGNOSIS — R45.1 AGITATION: ICD-10-CM

## 2021-04-21 DIAGNOSIS — F30.8 HYPOMANIC EPISODE (HCC): Primary | ICD-10-CM

## 2021-04-21 DIAGNOSIS — F31.81 BIPOLAR II DISORDER, MOST RECENT EPISODE HYPOMANIC (HCC): ICD-10-CM

## 2021-04-21 LAB
25(OH)D3 SERPL-MCNC: 37 NG/ML
ALBUMIN SERPL-MCNC: 5.2 G/DL (ref 3.5–5.2)
ALBUMIN/GLOB SERPL: 2.1 G/DL
ALP SERPL-CCNC: 59 U/L (ref 39–117)
ALT SERPL W P-5'-P-CCNC: 18 U/L (ref 1–41)
ANION GAP SERPL CALCULATED.3IONS-SCNC: 12.8 MMOL/L (ref 5–15)
AST SERPL-CCNC: 22 U/L (ref 1–40)
BILIRUB SERPL-MCNC: 0.6 MG/DL (ref 0–1.2)
BUN SERPL-MCNC: 10 MG/DL (ref 6–20)
BUN/CREAT SERPL: 11.1 (ref 7–25)
CALCIUM SPEC-SCNC: 10.3 MG/DL (ref 8.6–10.5)
CHLORIDE SERPL-SCNC: 103 MMOL/L (ref 98–107)
CHOLEST SERPL-MCNC: 152 MG/DL (ref 0–200)
CO2 SERPL-SCNC: 25.2 MMOL/L (ref 22–29)
CREAT SERPL-MCNC: 0.9 MG/DL (ref 0.76–1.27)
GFR SERPL CREATININE-BSD FRML MDRD: 103 ML/MIN/1.73
GLOBULIN UR ELPH-MCNC: 2.5 GM/DL
GLUCOSE SERPL-MCNC: 85 MG/DL (ref 65–99)
HDLC SERPL-MCNC: 34 MG/DL (ref 40–60)
LDLC SERPL CALC-MCNC: 97 MG/DL (ref 0–100)
LDLC/HDLC SERPL: 2.79 {RATIO}
POTASSIUM SERPL-SCNC: 4.1 MMOL/L (ref 3.5–5.2)
PROT SERPL-MCNC: 7.7 G/DL (ref 6–8.5)
SODIUM SERPL-SCNC: 141 MMOL/L (ref 136–145)
T3 SERPL-MCNC: 126 NG/DL (ref 80–200)
T4 FREE SERPL-MCNC: 1.23 NG/DL (ref 0.93–1.7)
TRIGL SERPL-MCNC: 116 MG/DL (ref 0–150)
TSH SERPL DL<=0.05 MIU/L-ACNC: 0.87 UIU/ML (ref 0.27–4.2)
VLDLC SERPL-MCNC: 21 MG/DL (ref 5–40)

## 2021-04-21 NOTE — TELEPHONE ENCOUNTER
Caller: ALEXIS SINGH    Relationship: Spouse    Best call back number: [phone]    What is the best time to reach you: EARLY AFTERNOON    Who are you requesting to speak with (clinical staff, provider,  specific staff member): CLINICAL      What was the call regarding: PATIENT'S WIFE STATED THE PATIENT IS NEEDING MORE PSYCHIATRIC HELP FOLLOWING THE APPOINT HE HAD WITH PCP. SHE WOULD LIKE TO KNOW IF [facility] [place] WOULD BE A GOOD OFFICE TO TAKE HIM.    Do you require a callback: YES

## 2021-04-21 NOTE — TELEPHONE ENCOUNTER
Contacted patient because Rosie Hines is not on pt's verbal release. Pt states he is at 's office but they do not accept walk ins anymore. Pt would like a referral placed and appt scheduled.

## 2021-04-22 LAB — CRP SERPL-MCNC: 0.09 MG/DL (ref 0.01–0.5)

## 2021-05-06 ENCOUNTER — OFFICE VISIT (OUTPATIENT)
Dept: FAMILY MEDICINE CLINIC | Facility: CLINIC | Age: 26
End: 2021-05-06

## 2021-05-06 VITALS
HEIGHT: 70 IN | OXYGEN SATURATION: 99 % | DIASTOLIC BLOOD PRESSURE: 78 MMHG | BODY MASS INDEX: 26.48 KG/M2 | WEIGHT: 185 LBS | HEART RATE: 79 BPM | SYSTOLIC BLOOD PRESSURE: 104 MMHG

## 2021-05-06 DIAGNOSIS — R45.1 AGITATION: ICD-10-CM

## 2021-05-06 DIAGNOSIS — F41.9 ANXIETY: ICD-10-CM

## 2021-05-06 DIAGNOSIS — F31.81 BIPOLAR II DISORDER, MOST RECENT EPISODE HYPOMANIC (HCC): ICD-10-CM

## 2021-05-06 DIAGNOSIS — F30.8 HYPOMANIC EPISODE (HCC): Primary | ICD-10-CM

## 2021-05-06 PROCEDURE — 99214 OFFICE O/P EST MOD 30 MIN: CPT | Performed by: FAMILY MEDICINE

## 2021-05-06 RX ORDER — ESCITALOPRAM OXALATE 10 MG/1
10 TABLET ORAL DAILY
Qty: 30 TABLET | Refills: 0 | Status: SHIPPED | OUTPATIENT
Start: 2021-05-06 | End: 2021-06-04 | Stop reason: SDUPTHER

## 2021-05-06 RX ORDER — OLANZAPINE 5 MG/1
5 TABLET ORAL NIGHTLY
Qty: 30 TABLET | Refills: 0 | Status: SHIPPED | OUTPATIENT
Start: 2021-05-06 | End: 2021-06-04 | Stop reason: SDUPTHER

## 2021-05-10 ENCOUNTER — TELEPHONE (OUTPATIENT)
Dept: FAMILY MEDICINE CLINIC | Facility: CLINIC | Age: 26
End: 2021-05-10

## 2021-05-10 NOTE — TELEPHONE ENCOUNTER
Caller: Augustin Frederick    Relationship: Self    Best call back number: [phone]    Who are you requesting to speak with (clinical staff, provider,  specific staff member): CLINICAL STAFF    What was the call regarding: PATIENT WOULD LIKE TO KNOW IF HIS FMLA PAPERWORK WAS RECEIVED     Do you require a callback: YES

## 2021-05-11 NOTE — TELEPHONE ENCOUNTER
We did receive the papers for FMLA for this pt however Dr Arita referred pt out to behavioral health and that is who will have to fill out his FMLA  Papers due to him needing a leave of absence due to his mental health. I contacted human resources for [employer] about 2 weeks ago and advised them as welll that the FMLA needs to be sent to Behavioral health. Tried to call pt and relay this info and no answer and voicemail was full.

## 2021-05-17 ENCOUNTER — TELEPHONE (OUTPATIENT)
Dept: FAMILY MEDICINE CLINIC | Facility: CLINIC | Age: 26
End: 2021-05-17

## 2021-06-04 DIAGNOSIS — F30.8 HYPOMANIC EPISODE (HCC): ICD-10-CM

## 2021-06-04 DIAGNOSIS — F41.9 ANXIETY: ICD-10-CM

## 2021-06-04 DIAGNOSIS — F31.81 BIPOLAR II DISORDER, MOST RECENT EPISODE HYPOMANIC (HCC): ICD-10-CM

## 2021-06-04 NOTE — TELEPHONE ENCOUNTER
Caller: Augustin Frederickl    Relationship: Self    Best call back number: [phone]    Medication needed:   Requested Prescriptions     Pending Prescriptions Disp Refills   • escitalopram (Lexapro) 10 MG tablet 30 tablet 0     Sig: Take 1 tablet by mouth Daily.   • OLANZapine (ZyPREXA) 5 MG tablet 30 tablet 0     Sig: Take 1 tablet by mouth Every Night for 30 days.       When do you need the refill by: 06/05/21    Does the patient have less than a 3 day supply:  [x] Yes  [] No    What is the [facility] [address] [facility] [address]  AT [facility] [address] - [phone]  - [phone] FX       patient will run out of medication over the weekend

## 2021-06-05 DIAGNOSIS — F31.81 BIPOLAR II DISORDER, MOST RECENT EPISODE HYPOMANIC (HCC): ICD-10-CM

## 2021-06-05 DIAGNOSIS — F30.8 HYPOMANIC EPISODE (HCC): ICD-10-CM

## 2021-06-05 DIAGNOSIS — F41.9 ANXIETY: ICD-10-CM

## 2021-06-06 RX ORDER — ESCITALOPRAM OXALATE 10 MG/1
10 TABLET ORAL DAILY
Qty: 30 TABLET | Refills: 0 | Status: SHIPPED | OUTPATIENT
Start: 2021-06-06 | End: 2021-07-07

## 2021-06-06 RX ORDER — OLANZAPINE 5 MG/1
5 TABLET ORAL NIGHTLY
Qty: 30 TABLET | Refills: 0 | Status: SHIPPED | OUTPATIENT
Start: 2021-06-06 | End: 2021-07-07

## 2021-06-07 RX ORDER — ESCITALOPRAM OXALATE 10 MG/1
TABLET ORAL
Qty: 30 TABLET | Refills: 0 | OUTPATIENT
Start: 2021-06-07

## 2021-06-07 RX ORDER — OLANZAPINE 5 MG/1
TABLET ORAL
Qty: 30 TABLET | Refills: 0 | OUTPATIENT
Start: 2021-06-07

## 2021-06-07 NOTE — TELEPHONE ENCOUNTER
Caller: Augustin Frederick    Relationship: Self    Best call back number:[phone]    Medication needed:   Requested Prescriptions     Pending Prescriptions Disp Refills   • OLANZapine (zyPREXA) 5 MG tablet [Pharmacy Med Name: OLANZapine 5 MG TABLET] 30 tablet 0     Sig: TAKE ONE TABLET BY MOUTH ONCE NIGHTLY   • escitalopram (LEXAPRO) 10 MG tablet [Pharmacy Med Name: ESCITALOPRAM 10 MG TABLET] 30 tablet 0     Sig: TAKE ONE TABLET BY MOUTH DAILY       When do you need the refill by: TODAY    What additional details did the patient provide when requesting the medication: PATIENT STATED HE CALLED ON Friday  BUT PHARMACY HAD NOT RECEIVED,  HAS BEEN OUT OF MEDICATION SINCE THAT DAY.    Does the patient have less than a 3 day supply:  [x] Yes  [] No    What is the patient's preferred pharmacy: [facility] [address] [place] [address] - [address]  AT [facility] [address] - [phone]  - [phone] FX

## 2021-06-09 ENCOUNTER — TELEPHONE (OUTPATIENT)
Dept: FAMILY MEDICINE CLINIC | Facility: CLINIC | Age: 26
End: 2021-06-09

## 2021-06-09 NOTE — TELEPHONE ENCOUNTER
Caller: KIARRA HERMOSILLO     Relationship: WIFE     Best call back number: [phone]    What form or medical record are you requesting: RETURN TO WORK NOTE     Who is requesting this form or medical record from you: VALERIANO  How would you like to receive the form or medical records  PATIENTS WIFE WOULD LIKE THE FORM FAXED TO VALERIANO   FAX -[phone]    Timeframe paperwork needed: AS SOON AS POSSIBLE     Additional notes: PATIENTS WIFE STATES THAT THE PATIENT WAS SEEN ON 4/20/2021. VALERIANO TOLD THE PATIENT THAT THE COULD NOT RETURN TO WORK WITHOUT A NOT PATIENTS WIFE WOULD LIKE IT SENT IN TODAY IF POSSIBLE

## 2021-06-10 NOTE — TELEPHONE ENCOUNTER
Contacted patient to get more information. Pt states he has been going to work but his job needs a letter from PCP stating that the patient can perform work duties and is okay to work.

## 2021-06-10 NOTE — TELEPHONE ENCOUNTER
PATIENT CAME INTO THE OFFICE TO GET A RETURN TO WORK NOTE. SAID HE IS TRYING TO GO BACK TO WORK INTERMITTENTLY AT THIS TIME. REQUESTED IT BE FAXED TO THE NUMBER IN THIS PREVIOUS MESSAGE.

## 2021-06-10 NOTE — TELEPHONE ENCOUNTER
Attempted to contact patient, no answer. Unable to leave voicemail, box is full.     Letter is in patient's chart. He can come to the office and pick it up or access it through his [employer].    Hub may relay message & document.

## 2021-06-11 NOTE — TELEPHONE ENCOUNTER
Contacted patient, patient states he will view the letter in his mychart & did not have any additional questions at this time.

## 2021-07-07 DIAGNOSIS — F31.81 BIPOLAR II DISORDER, MOST RECENT EPISODE HYPOMANIC (HCC): ICD-10-CM

## 2021-07-07 DIAGNOSIS — F41.9 ANXIETY: ICD-10-CM

## 2021-07-07 DIAGNOSIS — F30.8 HYPOMANIC EPISODE (HCC): ICD-10-CM

## 2021-07-07 RX ORDER — OLANZAPINE 5 MG/1
5 TABLET ORAL NIGHTLY
Qty: 30 TABLET | Refills: 0 | Status: CANCELLED | OUTPATIENT
Start: 2021-07-07 | End: 2021-08-06

## 2021-07-07 RX ORDER — ESCITALOPRAM OXALATE 10 MG/1
TABLET ORAL
Qty: 30 TABLET | Refills: 0 | Status: SHIPPED | OUTPATIENT
Start: 2021-07-07 | End: 2021-08-28

## 2021-07-07 RX ORDER — ESCITALOPRAM OXALATE 10 MG/1
10 TABLET ORAL DAILY
Qty: 30 TABLET | Refills: 0 | Status: CANCELLED | OUTPATIENT
Start: 2021-07-07

## 2021-07-07 RX ORDER — OLANZAPINE 5 MG/1
TABLET ORAL
Qty: 30 TABLET | Refills: 0 | Status: SHIPPED | OUTPATIENT
Start: 2021-07-07 | End: 2021-08-28

## 2021-08-28 DIAGNOSIS — F31.81 BIPOLAR II DISORDER, MOST RECENT EPISODE HYPOMANIC (HCC): ICD-10-CM

## 2021-08-28 DIAGNOSIS — F41.9 ANXIETY: ICD-10-CM

## 2021-08-28 DIAGNOSIS — F30.8 HYPOMANIC EPISODE (HCC): ICD-10-CM

## 2021-08-28 RX ORDER — OLANZAPINE 5 MG/1
TABLET ORAL
Qty: 30 TABLET | Refills: 0 | Status: SHIPPED | OUTPATIENT
Start: 2021-08-28 | End: 2022-09-29

## 2021-08-28 RX ORDER — ESCITALOPRAM OXALATE 10 MG/1
TABLET ORAL
Qty: 30 TABLET | Refills: 0 | Status: SHIPPED | OUTPATIENT
Start: 2021-08-28 | End: 2022-09-29

## 2022-09-29 ENCOUNTER — OFFICE VISIT (OUTPATIENT)
Dept: FAMILY MEDICINE CLINIC | Facility: CLINIC | Age: 27
End: 2022-09-29

## 2022-09-29 VITALS
HEART RATE: 102 BPM | SYSTOLIC BLOOD PRESSURE: 98 MMHG | WEIGHT: 165.6 LBS | TEMPERATURE: 98.2 F | DIASTOLIC BLOOD PRESSURE: 68 MMHG | BODY MASS INDEX: 23.76 KG/M2 | OXYGEN SATURATION: 96 %

## 2022-09-29 DIAGNOSIS — E78.2 MODERATE MIXED HYPERLIPIDEMIA NOT REQUIRING STATIN THERAPY: ICD-10-CM

## 2022-09-29 DIAGNOSIS — F31.81 BIPOLAR II DISORDER, MOST RECENT EPISODE HYPOMANIC: Primary | ICD-10-CM

## 2022-09-29 DIAGNOSIS — E55.9 VITAMIN D DEFICIENCY: ICD-10-CM

## 2022-09-29 PROCEDURE — 99213 OFFICE O/P EST LOW 20 MIN: CPT | Performed by: FAMILY MEDICINE

## 2022-09-29 RX ORDER — LAMOTRIGINE 100 MG/1
100 TABLET ORAL DAILY
Qty: 60 TABLET | Refills: 0 | Status: SHIPPED | OUTPATIENT
Start: 2022-09-29 | End: 2022-11-21

## 2022-09-29 RX ORDER — CHLORAL HYDRATE 500 MG
1000 CAPSULE ORAL
Qty: 90 CAPSULE | Refills: 3 | Status: SHIPPED | OUTPATIENT
Start: 2022-09-29

## 2022-09-29 RX ORDER — MELATONIN
1000 DAILY
Qty: 90 TABLET | Refills: 3 | Status: SHIPPED | OUTPATIENT
Start: 2022-09-29

## 2022-09-29 NOTE — PROGRESS NOTES
"Established Patient Office Visit      Patient Name: Augustin Frederick  : 1995   MRN: [id number]   Care Team: Patient Care Team:  Donny Arita DO as PCP - General (Family Medicine)    Chief Complaint:    Chief Complaint   Patient presents with   • Anxiety     Pt states he has anxiety and wants to talk to dr about medication       History of Present Illness: Augustin Frederick is a 27 y.o. male who is here today for chief complaint.    HPI      The patient has been working too much, living at work, and more work on top of work. He has not been doing \" horribly \". He was never able to have a full understanding of the last time he was seen. He reports that he was trying to get medicated for something, but he figured he needed something. The patient is going through a divorce now. He got a girl pregnant when he was really young, and they stayed together for the kids. He is no longer taking medication. He reports that he talks too much, and overexplain things. He was on the olanzapine, and Lexapro for a short period of time. He does not like the medication being hard for him, and then he comes into doctors, and his family has bad drug problems. He tries to not have to walk, and that is his biggest issue. He only comes here when he is really bad. He reports that he is not really bad today.    He has a little depression, and he feels like he sets himself up for a lot of it. He hates his life, but he knows all of his friends are way older than him. He reports that he does not hang out with people his age, and too much drama, but now he is becoming the drama. He reports that he started seeing his close friend's daughter, and it seemed like he was emotional. He was getting sores in his mouth, and he is not sure if it was due to being stressed, and shutting down. He is trying to take small bites of food because if he does a lot, he is going to get overwhelmed.    The following portions of the patient's " history were reviewed and updated as appropriate: allergies, current medications, past family history, past medical history, past social history, past surgical history and problem list.    Subjective      Review of Systems:   Review of Systems - See HPI    Past Medical History:   Past Medical History:   Diagnosis Date   • Anxiety    • Aortic valve, bicuspid        Past Surgical History:   Past Surgical History:   Procedure Laterality Date   • ANKLE SURGERY     • LEG SURGERY         Family History:   Family History   Problem Relation Age of Onset   • Heart disease Father    • Heart disease Paternal Grandfather        Social History:   Social History     Socioeconomic History   • Marital status:    Tobacco Use   • Smoking status: Never Smoker   • Smokeless tobacco: Never Used   Substance and Sexual Activity   • Alcohol use: Yes     Comment: occasionally    • Drug use: No   • Sexual activity: Defer       Tobacco History:   Social History     Tobacco Use   Smoking Status Never Smoker   Smokeless Tobacco Never Used       Medications:     Current Outpatient Medications:   •  cholecalciferol (Vitamin D) 25 MCG (1000 UT) tablet, Take 1 tablet by mouth Daily., Disp: 90 tablet, Rfl: 3  •  Omega-3 Fatty Acids (fish oil) 1000 MG capsule capsule, Take 1 capsule by mouth Daily With Breakfast., Disp: 90 capsule, Rfl: 3  •  hydrOXYzine (ATARAX) 25 MG tablet, Take 25 mg by mouth 3 (Three) Times a Day As Needed for Itching., Disp: , Rfl:   •  lamoTRIgine (LaMICtal) 100 MG tablet, Take 1 tablet by mouth Daily., Disp: 60 tablet, Rfl: 0    Allergies:   No Known Allergies    Objective   Objective     Physical Exam:  Vital Signs:   Vitals:    09/29/22 1428   BP: 98/68   BP Location: Left arm   Patient Position: Sitting   Cuff Size: Adult   Pulse: 102   Temp: 98.2 °F (36.8 °C)   TempSrc: Infrared   SpO2: 96%   Weight: 75.1 kg (165 lb 9.6 oz)     Body mass index is 23.76 kg/m².     Physical Exam  Const: NAD, A & Ox4, Pleasant,  Cooperative  Eyes: EOMI, no conjunctivitis  ENT: No nasal discharge present, neck supple  Cardiac: Regular rate and rhythm, no cyanosis  Resp: Respiratory rate within normal limits, no increased work of breathing, no audible wheezing or retractions noted  GI: No distention or ascites  MSK: Motor and sensation grossly intact in bilateral upper extremities  Neurologic: CN II-XII grossly intact  Psych: Appropriate mood and behavior.  Skin: Warm, dry  Procedures/Radiology     Procedures  No radiology results for the last 7 days     Assessment & Plan   Assessment / Plan      Assessment/Plan:   Problems Addressed This Visit  Diagnoses and all orders for this visit:    1. Bipolar II disorder, most recent episode hypomanic (HCC) (Primary)  -     lamoTRIgine (LaMICtal) 100 MG tablet; Take 1 tablet by mouth Daily.  Dispense: 60 tablet; Refill: 0  -     Lamotrigine level; Future    2. Moderate mixed hyperlipidemia not requiring statin therapy  -     Omega-3 Fatty Acids (fish oil) 1000 MG capsule capsule; Take 1 capsule by mouth Daily With Breakfast.  Dispense: 90 capsule; Refill: 3    3. Vitamin D deficiency  -     cholecalciferol (Vitamin D) 25 MCG (1000 UT) tablet; Take 1 tablet by mouth Daily.  Dispense: 90 tablet; Refill: 3      Problem List Items Addressed This Visit        Mental Health    Bipolar II disorder, most recent episode hypomanic (HCC) - Primary    Relevant Medications    lamoTRIgine (LaMICtal) 100 MG tablet    Other Relevant Orders    Lamotrigine level      Other Visit Diagnoses     Moderate mixed hyperlipidemia not requiring statin therapy        Relevant Medications    Omega-3 Fatty Acids (fish oil) 1000 MG capsule capsule    Vitamin D deficiency        Relevant Medications    cholecalciferol (Vitamin D) 25 MCG (1000 UT) tablet          There are no Patient Instructions on file for this visit.    Follow Up:   Return in about 6 weeks (around 11/10/2022) for (non-fasting blood work 1 week prior to  appt).    JULEE Arita DO   [facility] [address]  [facility]  [address]  [facility] [address]  Fax [phone]  Phone [phone]       Transcribed from ambient dictation for Donny Arita DO by Gabriela Mackenzie.  09/29/22   15:01 EDT    Patient verbalized consent to the visit recording.  I have personally performed the services described in this document as transcribed by the above individual, and it is both accurate and complete.

## 2022-10-11 ENCOUNTER — TELEPHONE (OUTPATIENT)
Dept: FAMILY MEDICINE CLINIC | Facility: CLINIC | Age: 27
End: 2022-10-11

## 2022-11-14 ENCOUNTER — LAB (OUTPATIENT)
Dept: LAB | Facility: HOSPITAL | Age: 27
End: 2022-11-14

## 2022-11-14 ENCOUNTER — APPOINTMENT (OUTPATIENT)
Dept: LAB | Facility: HOSPITAL | Age: 27
End: 2022-11-14

## 2022-11-14 DIAGNOSIS — F31.81 BIPOLAR II DISORDER, MOST RECENT EPISODE HYPOMANIC: ICD-10-CM

## 2022-11-14 DIAGNOSIS — F31.81 BIPOLAR II DISORDER, MOST RECENT EPISODE HYPOMANIC: Primary | ICD-10-CM

## 2022-11-14 DIAGNOSIS — E78.2 MODERATE MIXED HYPERLIPIDEMIA NOT REQUIRING STATIN THERAPY: ICD-10-CM

## 2022-11-14 LAB
ALBUMIN SERPL-MCNC: 4.9 G/DL (ref 3.5–5.2)
ALBUMIN/GLOB SERPL: 2.9 G/DL
ALP SERPL-CCNC: 50 U/L (ref 39–117)
ALT SERPL W P-5'-P-CCNC: 12 U/L (ref 1–41)
ANION GAP SERPL CALCULATED.3IONS-SCNC: 12.8 MMOL/L (ref 5–15)
AST SERPL-CCNC: 19 U/L (ref 1–40)
BILIRUB SERPL-MCNC: 0.3 MG/DL (ref 0–1.2)
BUN SERPL-MCNC: 12 MG/DL (ref 6–20)
BUN/CREAT SERPL: 11.1 (ref 7–25)
CALCIUM SPEC-SCNC: 10.3 MG/DL (ref 8.6–10.5)
CHLORIDE SERPL-SCNC: 105 MMOL/L (ref 98–107)
CHOLEST SERPL-MCNC: 166 MG/DL (ref 0–200)
CO2 SERPL-SCNC: 22.2 MMOL/L (ref 22–29)
CREAT SERPL-MCNC: 1.08 MG/DL (ref 0.76–1.27)
EGFRCR SERPLBLD CKD-EPI 2021: 96.5 ML/MIN/1.73
GLOBULIN UR ELPH-MCNC: 1.7 GM/DL
GLUCOSE SERPL-MCNC: 114 MG/DL (ref 65–99)
HDLC SERPL-MCNC: 49 MG/DL (ref 40–60)
LDLC SERPL CALC-MCNC: 101 MG/DL (ref 0–100)
LDLC/HDLC SERPL: 2.04 {RATIO}
POTASSIUM SERPL-SCNC: 4.1 MMOL/L (ref 3.5–5.2)
PROT SERPL-MCNC: 6.6 G/DL (ref 6–8.5)
SODIUM SERPL-SCNC: 140 MMOL/L (ref 136–145)
TRIGL SERPL-MCNC: 85 MG/DL (ref 0–150)
VLDLC SERPL-MCNC: 16 MG/DL (ref 5–40)

## 2022-11-14 PROCEDURE — 36415 COLL VENOUS BLD VENIPUNCTURE: CPT

## 2022-11-14 PROCEDURE — 80053 COMPREHEN METABOLIC PANEL: CPT

## 2022-11-14 PROCEDURE — 80175 DRUG SCREEN QUAN LAMOTRIGINE: CPT

## 2022-11-14 PROCEDURE — 80061 LIPID PANEL: CPT

## 2022-11-17 LAB — LAMOTRIGINE SERPL-MCNC: 1.7 UG/ML (ref 2–20)

## 2022-11-21 ENCOUNTER — OFFICE VISIT (OUTPATIENT)
Dept: FAMILY MEDICINE CLINIC | Facility: CLINIC | Age: 27
End: 2022-11-21

## 2022-11-21 VITALS
DIASTOLIC BLOOD PRESSURE: 70 MMHG | HEART RATE: 104 BPM | OXYGEN SATURATION: 96 % | TEMPERATURE: 97.7 F | BODY MASS INDEX: 23.33 KG/M2 | SYSTOLIC BLOOD PRESSURE: 98 MMHG | WEIGHT: 162.6 LBS

## 2022-11-21 DIAGNOSIS — F31.81 BIPOLAR II DISORDER, MOST RECENT EPISODE HYPOMANIC: Primary | ICD-10-CM

## 2022-11-21 DIAGNOSIS — Z51.81 THERAPEUTIC DRUG MONITORING: ICD-10-CM

## 2022-11-21 PROCEDURE — 99213 OFFICE O/P EST LOW 20 MIN: CPT | Performed by: FAMILY MEDICINE

## 2022-11-21 RX ORDER — LAMOTRIGINE 150 MG/1
150 TABLET ORAL 2 TIMES DAILY
Qty: 60 TABLET | Refills: 2 | Status: SHIPPED | OUTPATIENT
Start: 2022-11-21 | End: 2022-11-21

## 2022-11-21 RX ORDER — LAMOTRIGINE 200 MG/1
200 TABLET ORAL DAILY
Qty: 90 TABLET | Refills: 0 | Status: SHIPPED | OUTPATIENT
Start: 2022-11-21 | End: 2023-03-22

## 2022-11-21 NOTE — PROGRESS NOTES
Established Patient Office Visit      Patient Name: Augustin Frederick  : 1995   MRN: [id number]   Care Team: Patient Care Team:  Donny Arita DO as PCP - General (Family Medicine)    Chief Complaint:    Chief Complaint   Patient presents with   • Follow-up     Bipolar II disorder, HCC        History of Present Illness: Augustin Frederick is a 27 y.o. male who is here today for chief complaint.    HPI    The patient presents today for a follow-up on labs and medication. At his last visit, he started lamotrigine. He had labs completed last week in preparation for his visit today.    The patient reports lamotrigine is helping. He states it seems like it is a little more stable, but there are some days where he can barely get out of bed and it takes every bit of energy for him. He did start the paperwork for FMLA in case he needed it. He reports that the last time he tried it, he got the short term instead of the intermittent. He reports that he keeps being in the call and signed up for everything, but they have not given him the paperwork for it yet.    He does not want an influenza vaccine today.      The following portions of the patient's history were reviewed and updated as appropriate: allergies, current medications, past family history, past medical history, past social history, past surgical history and problem list.    Subjective      Review of Systems:   Review of Systems - See HPI    Past Medical History:   Past Medical History:   Diagnosis Date   • Anxiety    • Aortic valve, bicuspid        Past Surgical History:   Past Surgical History:   Procedure Laterality Date   • ANKLE SURGERY     • LEG SURGERY         Family History:   Family History   Problem Relation Age of Onset   • Heart disease Father    • Heart disease Paternal Grandfather        Social History:   Social History     Socioeconomic History   • Marital status:    Tobacco Use   • Smoking status: Never   • Smokeless  tobacco: Never   Substance and Sexual Activity   • Alcohol use: Yes     Comment: occasionally    • Drug use: No   • Sexual activity: Defer       Tobacco History:   Social History     Tobacco Use   Smoking Status Never   Smokeless Tobacco Never       Medications:     Current Outpatient Medications:   •  lamoTRIgine (LaMICtal) 200 MG tablet, Take 1 tablet by mouth Daily., Disp: 90 tablet, Rfl: 0  •  cholecalciferol (Vitamin D) 25 MCG (1000 UT) tablet, Take 1 tablet by mouth Daily., Disp: 90 tablet, Rfl: 3  •  Omega-3 Fatty Acids (fish oil) 1000 MG capsule capsule, Take 1 capsule by mouth Daily With Breakfast., Disp: 90 capsule, Rfl: 3    Allergies:   No Known Allergies    Objective   Objective     Physical Exam:  Vital Signs:   Vitals:    11/21/22 1342   BP: 98/70   BP Location: Left arm   Patient Position: Sitting   Cuff Size: Adult   Pulse: 104   Temp: 97.7 °F (36.5 °C)   TempSrc: Infrared   SpO2: 96%   Weight: 73.8 kg (162 lb 9.6 oz)     Body mass index is 23.33 kg/m².     Physical Exam  Const: NAD, A & Ox4, Pleasant, Cooperative  Eyes: EOMI, no conjunctivitis  ENT: No nasal discharge present, neck supple  Cardiac: Regular rate and rhythm, no cyanosis  Resp: Respiratory rate within normal limits, no increased work of breathing, no audible wheezing or retractions noted  GI: No distention or ascites  MSK: Motor and sensation grossly intact in bilateral upper extremities  Neurologic: CN II-XII grossly intact  Psych: Appropriate mood and behavior.  Skin: Warm, dry  Procedures/Radiology     Procedures  No radiology results for the last 7 days     Assessment & Plan   Assessment / Plan      Assessment/Plan:   Problems Addressed This Visit  Diagnoses and all orders for this visit:    1. Bipolar II disorder, most recent episode hypomanic (HCC) (Primary)  -     Discontinue: lamoTRIgine (LaMICtal) 150 MG tablet; Take 1 tablet by mouth 2 (Two) Times a Day.  Dispense: 60 tablet; Refill: 2  -     Ambulatory Referral to  Psychiatry  -     lamoTRIgine (LaMICtal) 200 MG tablet; Take 1 tablet by mouth Daily.  Dispense: 90 tablet; Refill: 0  -     Lamotrigine level; Future    2. Therapeutic drug monitoring  -     Lamotrigine level; Future      Problem List Items Addressed This Visit        Mental Health    Bipolar II disorder, most recent episode hypomanic (HCC) - Primary    Relevant Medications    lamoTRIgine (LaMICtal) 200 MG tablet    Other Relevant Orders    Ambulatory Referral to Psychiatry    Lamotrigine level   Other Visit Diagnoses     Therapeutic drug monitoring        Relevant Orders    Lamotrigine level          Bipolar disorder.  - Referral to psychiatry previously placed last year, today. He has responded fairly well so far to the lamotrigine 100 mg once daily. He does not think he would be able to remember twice daily dosing. His lamotrigine level last week was subtherapeutic, we will increase to 200 mg daily, repeat level in a couple of months, and see him back for follow-up.    There are no Patient Instructions on file for this visit.    Follow Up:   Return in about 3 months (around 2/21/2023) for lamictal, labs 1 week before.    DO RICHARD BarrtetE [address]  [facility]  [address] [facility] [address]  Fax [phone]  Phone [phone]       Transcribed from ambient dictation for Donny Arita DO by Gabriela Maceknzie.  11/21/22   14:20 EST    Patient or patient representative verbalized consent to the visit recording.  I have personally performed the services described in this document as transcribed by the above individual, and it is both accurate and complete.

## 2023-03-17 DIAGNOSIS — F31.81 BIPOLAR II DISORDER, MOST RECENT EPISODE HYPOMANIC: ICD-10-CM

## 2023-03-18 NOTE — TELEPHONE ENCOUNTER
Rx Refill Note  Requested Prescriptions     Pending Prescriptions Disp Refills   • lamoTRIgine (LaMICtal) 200 MG tablet [Pharmacy Med Name: lamoTRIgine 200 MG TABLET] 30 tablet      Sig: TAKE ONE TABLET BY MOUTH DAILY      Last office visit with prescribing clinician: 11/21/2022   Last telemedicine visit with prescribing clinician: Visit date not found   Next office visit with prescribing clinician: Visit date not found                         Would you like a call back once the refill request has been completed: [] Yes [] No    If the office needs to give you a call back, can they leave a voicemail: [] Yes [] No    Jessica Hines MA  03/18/23, 10:27 EDT     Lvm to call office back.       Return in about 3 months (around 2/21/2023) for lamictal, labs 1 week before.      HUB CAN RELY MESSAGE AND SCHEDULE   '

## 2023-03-21 NOTE — TELEPHONE ENCOUNTER
Rx Refill Note  Requested Prescriptions     Pending Prescriptions Disp Refills   • lamoTRIgine (LaMICtal) 200 MG tablet [Pharmacy Med Name: lamoTRIgine 200 MG TABLET] 30 tablet      Sig: TAKE ONE TABLET BY MOUTH DAILY      Last office visit with prescribing clinician: 11/21/2022   Last telemedicine visit with prescribing clinician: Visit date not found   Next office visit with prescribing clinician: Visit date not found                         Would you like a call back once the refill request has been completed: [] Yes [] No    If the office needs to give you a call back, can they leave a voicemail: [] Yes [] No    Javi Grant MA  03/21/23, 13:15 EDT      Patient was to Return in about 3 months (around 2/21/2023) for lamictal, labs 1 week before.    Called and left  for patient to return call    OK FOR HUB TO RELAY MESSAGE AND SCHEDULE APPOINTMENT

## 2023-03-22 RX ORDER — LAMOTRIGINE 200 MG/1
TABLET ORAL
Qty: 30 TABLET | Refills: 2 | Status: SHIPPED | OUTPATIENT
Start: 2023-03-22

## 2023-03-22 NOTE — TELEPHONE ENCOUNTER
3rd attempt     Patient was to Return in about 3 months (around 2/21/2023) for lamictal, labs 1 week before.     Called and left  for patient to return call     OK FOR HUB TO RELAY MESSAGE AND SCHEDULE APPOINTMENT

## 2023-03-30 ENCOUNTER — OFFICE VISIT (OUTPATIENT)
Dept: FAMILY MEDICINE CLINIC | Facility: CLINIC | Age: 28
End: 2023-03-30
Payer: COMMERCIAL

## 2023-03-30 VITALS
DIASTOLIC BLOOD PRESSURE: 78 MMHG | SYSTOLIC BLOOD PRESSURE: 118 MMHG | HEIGHT: 70 IN | WEIGHT: 166.2 LBS | TEMPERATURE: 98 F | BODY MASS INDEX: 23.79 KG/M2 | HEART RATE: 82 BPM

## 2023-03-30 DIAGNOSIS — F31.81 BIPOLAR II DISORDER, MOST RECENT EPISODE HYPOMANIC: Primary | ICD-10-CM

## 2023-03-30 PROCEDURE — 99213 OFFICE O/P EST LOW 20 MIN: CPT | Performed by: FAMILY MEDICINE

## 2023-03-30 NOTE — ASSESSMENT & PLAN NOTE
Recommended that he go back on the lamotrigine 200 mg daily.  He is stated previously he would likely not remember twice a day dosing which is why we have maintained him on once per day.  Previous level on 100 mg daily was subtherapeutic, I like him to be back on the lamotrigine for at least 6 weeks and to have his level rechecked.  Labs have already been ordered.

## 2023-03-30 NOTE — PROGRESS NOTES
Established Patient Office Visit      Patient Name: Augustin Frederick  : 1995   MRN: [id number]   Care Team: Patient Care Team:  Donny Arita DO as PCP - General (Family Medicine)    Chief Complaint:    Chief Complaint   Patient presents with   • Manic Behavior     3 month follow-up TDM   • Abdominal Pain     Pt also co intermittent ab pain       History of Present Illness: Augustin Frederick is a 27 y.o. male who is here today for chief complaint.    HPI    Ran out last week.  He did not realize that a refill has been sent to his pharmacy, so has been off of his Lamictal for a week.  He has not noticed much of a difference at his baseline.    He has had some slight left lower quadrant abdominal pain for the last week.  Sporadic, comes for about an hour or so and then resolve spontaneously.  It is not associated with any food intake or activity.    This patient is accompanied by their self who contributes to the history of their care.    The following portions of the patient's history were reviewed and updated as appropriate: allergies, current medications, past family history, past medical history, past social history, past surgical history and problem list.    Subjective      Review of Systems:   Review of Systems - See HPI    Past Medical History:   Past Medical History:   Diagnosis Date   • Anxiety    • Aortic valve, bicuspid        Past Surgical History:   Past Surgical History:   Procedure Laterality Date   • ANKLE SURGERY     • LEG SURGERY         Family History:   Family History   Problem Relation Age of Onset   • Heart disease Father    • Heart disease Paternal Grandfather        Social History:   Social History     Socioeconomic History   • Marital status:    Tobacco Use   • Smoking status: Never   • Smokeless tobacco: Never   Substance and Sexual Activity   • Alcohol use: Yes     Comment: occasionally    • Drug use: No   • Sexual activity: Defer       Tobacco History:  "  Social History     Tobacco Use   Smoking Status Never   Smokeless Tobacco Never       Medications:     Current Outpatient Medications:   •  cholecalciferol (Vitamin D) 25 MCG (1000 UT) tablet, Take 1 tablet by mouth Daily., Disp: 90 tablet, Rfl: 3  •  lamoTRIgine (LaMICtal) 200 MG tablet, TAKE ONE TABLET BY MOUTH DAILY, Disp: 30 tablet, Rfl: 2  •  Omega-3 Fatty Acids (fish oil) 1000 MG capsule capsule, Take 1 capsule by mouth Daily With Breakfast., Disp: 90 capsule, Rfl: 3    Allergies:   No Known Allergies    Objective   Objective     Physical Exam:  Vital Signs:   Vitals:    03/30/23 1549   BP: 118/78   BP Location: Left arm   Patient Position: Sitting   Cuff Size: Adult   Pulse: 82   Temp: 98 °F (36.7 °C)   TempSrc: Infrared   Weight: 75.4 kg (166 lb 3.2 oz)   Height: 177.8 cm (70\")     Body mass index is 23.85 kg/m².     Physical Exam  Nursing note reviewed  Const: NAD, A&Ox4, Pleasant, Cooperative  Eyes: EOMI, no conjunctivitis  ENT: No nasal discharge present, neck supple  Cardiac: Regular rate and rhythm, no cyanosis  Resp: Respiratory rate within normal limits, no increased work of breathing, no audible wheezing or retractions noted  GI: No distention or ascites  MSK: Motor and sensation grossly intact in bilateral upper extremities  Neurologic: CN II-XII grossly intact  Psych: Appropriate mood and behavior.  Skin: Warm, dry  Procedures/Radiology     Procedures  No radiology results for the last 7 days     Assessment & Plan   Assessment / Plan      Assessment/Plan:   Problems Addressed This Visit  Diagnoses and all orders for this visit:    1. Bipolar II disorder, most recent episode hypomanic (HCC) (Primary)  Assessment & Plan:  Recommended that he go back on the lamotrigine 200 mg daily.  He is stated previously he would likely not remember twice a day dosing which is why we have maintained him on once per day.  Previous level on 100 mg daily was subtherapeutic, I like him to be back on the lamotrigine " for at least 6 weeks and to have his level rechecked.  Labs have already been ordered.      Problem List Items Addressed This Visit        Mental Health    Bipolar II disorder, most recent episode hypomanic (HCC) - Primary    Current Assessment & Plan     Recommended that he go back on the lamotrigine 200 mg daily.  He is stated previously he would likely not remember twice a day dosing which is why we have maintained him on once per day.  Previous level on 100 mg daily was subtherapeutic, I like him to be back on the lamotrigine for at least 6 weeks and to have his level rechecked.  Labs have already been ordered.            In terms of his abdominal pain, I think this may be due to some slight withdrawal from the lamotrigine, if his pain persists after resuming the medication he will let me know.  Exam today is benign.    There are no Patient Instructions on file for this visit.    Follow Up:   Return in about 6 months (around 9/30/2023) for (non-fasting blood work 1 week prior to appt).    DO VICTORIA Barrett RD  [facility]  [address]  [facility] [address]  Fax [phone]  Phone [phone]

## 2023-10-24 DIAGNOSIS — F31.81 BIPOLAR II DISORDER, MOST RECENT EPISODE HYPOMANIC: ICD-10-CM

## 2023-10-25 RX ORDER — LAMOTRIGINE 200 MG/1
200 TABLET ORAL DAILY
Qty: 90 TABLET | Refills: 0 | Status: SHIPPED | OUTPATIENT
Start: 2023-10-25

## 2024-01-04 ENCOUNTER — TRANSCRIBE ORDERS (OUTPATIENT)
Dept: ADMINISTRATIVE | Facility: HOSPITAL | Age: 29
End: 2024-01-04
Payer: COMMERCIAL

## 2024-01-04 DIAGNOSIS — U07.0 VAPING-RELATED DISORDER: ICD-10-CM

## 2024-01-04 DIAGNOSIS — R06.02 SHORTNESS OF BREATH: Primary | ICD-10-CM

## 2024-01-30 DIAGNOSIS — F31.81 BIPOLAR II DISORDER, MOST RECENT EPISODE HYPOMANIC: ICD-10-CM

## 2024-01-30 RX ORDER — LAMOTRIGINE 200 MG/1
200 TABLET ORAL DAILY
Qty: 90 TABLET | Refills: 0 | OUTPATIENT
Start: 2024-01-30

## 2024-01-31 DIAGNOSIS — F31.81 BIPOLAR II DISORDER, MOST RECENT EPISODE HYPOMANIC: ICD-10-CM

## 2024-01-31 NOTE — TELEPHONE ENCOUNTER
Rx Refill Note  Requested Prescriptions     Pending Prescriptions Disp Refills    lamoTRIgine (LaMICtal) 200 MG tablet [Pharmacy Med Name: lamoTRIgine 200 MG TABLET] 90 tablet 0     Sig: TAKE 1 TABLET BY MOUTH DAILY      Last office visit with prescribing clinician: 3/30/2023   Last telemedicine visit with prescribing clinician: Visit date not found   Next office visit with prescribing clinician: Visit date not found                         Would you like a call back once the refill request has been completed: [] Yes [] No    If the office needs to give you a call back, can they leave a voicemail: [] Yes [] No    Ashleigh Valdez MA  01/31/24, 15:46 EST

## 2024-02-02 RX ORDER — LAMOTRIGINE 200 MG/1
200 TABLET ORAL DAILY
Qty: 90 TABLET | Refills: 0 | Status: SHIPPED | OUTPATIENT
Start: 2024-02-02

## 2024-06-06 DIAGNOSIS — F31.81 BIPOLAR II DISORDER, MOST RECENT EPISODE HYPOMANIC: ICD-10-CM

## 2024-06-06 NOTE — TELEPHONE ENCOUNTER
Caller: Augustin Frederickl    Relationship: Self    Best call back number: [phone]    Requested Prescriptions:   Requested Prescriptions     Pending Prescriptions Disp Refills    lamoTRIgine (LaMICtal) 200 MG tablet 90 tablet 0     Sig: Take 1 tablet by mouth Daily.        Pharmacy where request should be sent: [facility] [id number] [place], [place] - [address] AT [facility] [address] - [phone]  - [phone] FX     Last office visit with prescribing clinician: 3/30/2023   Last telemedicine visit with prescribing clinician: Visit date not found   Next office visit with prescribing clinician: Visit date not found     Additional details provided by patient: PT IS OUT AND HAS NO REFILLS AVAILABLE    Does the patient have less than a 3 day supply:  [x] Yes  [] No    Would you like a call back once the refill request has been completed: [x] Yes [] No    If the office needs to give you a call back, can they leave a voicemail: [x] Yes [] No    Nohelia Naranjo   06/06/24 15:09 EDT

## 2024-06-10 RX ORDER — LAMOTRIGINE 200 MG/1
200 TABLET ORAL DAILY
Qty: 90 TABLET | Refills: 0 | Status: SHIPPED | OUTPATIENT
Start: 2024-06-10

## 2024-06-10 NOTE — TELEPHONE ENCOUNTER
Caller: Augustin Frederick    Relationship: Self    Best call back number: [phone]     Requested Prescriptions:   Requested Prescriptions     Pending Prescriptions Disp Refills    lamoTRIgine (LaMICtal) 200 MG tablet 90 tablet 0     Sig: Take 1 tablet by mouth Daily.        Pharmacy where request should be sent: [facility] [id number] [place], [place] - [address] AT [facility] [address] - [phone]  - [phone] FX     Last office visit with prescribing clinician: 3/30/2023   Last telemedicine visit with prescribing clinician: Visit date not found   Next office visit with prescribing clinician: Visit date not found     Additional details provided by patient: OUT OF MEDICATION FOR ABOUT A WEEK AND STARTING TO FEEL HAZY. PATIENT HAS A PHYSICAL SCHEDULED WITH [facility] ON 7/6/24.    Does the patient have less than a 3 day supply:  [x] Yes  [] No    Would you like a call back once the refill request has been completed: [] Yes [x] No    If the office needs to give you a call back, can they leave a voicemail: [] Yes [x] No    Nohelia Buchanan Rep   06/10/24 14:34 EDT

## 2024-07-06 ENCOUNTER — OFFICE VISIT (OUTPATIENT)
Dept: FAMILY MEDICINE CLINIC | Facility: CLINIC | Age: 29
End: 2024-07-06
Payer: COMMERCIAL

## 2024-07-06 VITALS
HEIGHT: 69 IN | WEIGHT: 165.8 LBS | SYSTOLIC BLOOD PRESSURE: 112 MMHG | HEART RATE: 77 BPM | BODY MASS INDEX: 24.56 KG/M2 | DIASTOLIC BLOOD PRESSURE: 78 MMHG | OXYGEN SATURATION: 100 %

## 2024-07-06 DIAGNOSIS — Z00.00 HEALTHCARE MAINTENANCE: Primary | ICD-10-CM

## 2024-07-06 DIAGNOSIS — Z72.0 VAPES NICOTINE CONTAINING SUBSTANCE: ICD-10-CM

## 2024-07-06 DIAGNOSIS — F31.81 BIPOLAR II DISORDER, MOST RECENT EPISODE HYPOMANIC: ICD-10-CM

## 2024-07-06 PROCEDURE — 99395 PREV VISIT EST AGE 18-39: CPT | Performed by: PHYSICIAN ASSISTANT

## 2024-07-06 NOTE — PROGRESS NOTES
"Reason for visit    Augustin Frederick is a 29 y.o. male who presents for annual comprehensive exam.    Chief Complaint   Patient presents with    Annual Exam     Discuss smoking cessation       HPI     Here for physical.   Patient of Dr. Arita.   Mood is good on lamictal.   Works for [employer].     Diet/Physical activity:  -\"Tries to\" but does not always eat a healthy diet. Occasional fast food. Better about reducing this recently.   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     Immunizations:  -Declines COVID vaccine.     Cancer screening:   -No known family history of cancer.     PHQ-9 Depression Screening  Little interest or pleasure in doing things? 0-->not at all   Feeling down, depressed, or hopeless? 0-->not at all   Trouble falling or staying asleep, or sleeping too much?     Feeling tired or having little energy?     Poor appetite or overeating?     Feeling bad about yourself - or that you are a failure or have let yourself or your family down?     Trouble concentrating on things, such as reading the newspaper or watching television?     Moving or speaking so slowly that other people could have noticed? Or the opposite - being so fidgety or restless that you have been moving around a lot more than usual?     Thoughts that you would be better off dead, or of hurting yourself in some way?     PHQ-9 Total Score 0   If you checked off any problems, how difficult have these problems made it for you to do your work, take care of things at home, or get along with other people?           Substance use:  -Vaping currently. Sometimes José Miguel pouches.   -4-5 drinks tequila OJ. He has cut back on alcohol intake over the past year.   -Denies recreational drug use.   -Few sodas/day.     Sexual health:  -Relationship with female partner x 1.5 years.     Dental/vision/skin:  -Last eye exam 1 year ago.   -Current with dental exams.   -Skin tags but otherwise denies new/changing lesions.     Past Medical History: "   Diagnosis Date    Anxiety     Aortic valve, bicuspid        Past Surgical History:   Procedure Laterality Date    ANKLE SURGERY      LEG SURGERY         Family History   Problem Relation Age of Onset    Heart disease Father     Heart disease Paternal Grandfather        Social History     Socioeconomic History    Marital status: Legally    Tobacco Use    Smoking status: Never    Smokeless tobacco: Never   Vaping Use    Vaping status: Every Day    Substances: Nicotine    Devices: Disposable   Substance and Sexual Activity    Alcohol use: Yes     Comment: occasionally     Drug use: No    Sexual activity: Defer       No Known Allergies    ROS    Review of Systems   Constitutional:  Positive for diaphoresis. Negative for appetite change, chills, fatigue, fever and unexpected weight loss.   HENT:  Positive for trouble swallowing. Negative for congestion, hearing loss, sore throat and swollen glands.    Eyes:  Positive for blurred vision.   Respiratory:  Negative for cough and shortness of breath.    Cardiovascular:  Negative for chest pain.   Gastrointestinal:  Negative for abdominal pain, blood in stool, constipation, diarrhea and GERD.   Endocrine: Negative for polyuria.   Genitourinary:  Negative for difficulty urinating, dysuria, hematuria, scrotal swelling and testicular pain.   Musculoskeletal:  Negative for arthralgias and myalgias.   Skin:  Positive for skin lesions. Negative for rash.   Neurological:  Negative for dizziness, numbness and headache.   Hematological:  Negative for adenopathy.   Psychiatric/Behavioral:  Negative for sleep disturbance and depressed mood. The patient is not nervous/anxious.        Vitals:    07/06/24 0918   BP: 112/78   Pulse: 77   SpO2: 100%     Body mass index is 24.48 kg/m².      Current Outpatient Medications:     lamoTRIgine (LaMICtal) 200 MG tablet, Take 1 tablet by mouth Daily., Disp: 90 tablet, Rfl: 0    cholecalciferol (Vitamin D) 25 MCG (1000 UT) tablet, Take 1  tablet by mouth Daily. (Patient not taking: Reported on 7/6/2024), Disp: 90 tablet, Rfl: 3    Omega-3 Fatty Acids (fish oil) 1000 MG capsule capsule, Take 1 capsule by mouth Daily With Breakfast. (Patient not taking: Reported on 7/6/2024), Disp: 90 capsule, Rfl: 3    PE    Physical Exam  Vitals reviewed.   Constitutional:       General: He is not in acute distress.     Appearance: He is well-developed.   HENT:      Head: Normocephalic and atraumatic.      Right Ear: Hearing and tympanic membrane normal.      Left Ear: Hearing and tympanic membrane normal.      Mouth/Throat:      Mouth: Mucous membranes are moist.      Dentition: Normal dentition.      Pharynx: Oropharynx is clear.   Eyes:      Extraocular Movements: Extraocular movements intact.      Conjunctiva/sclera: Conjunctivae normal.      Pupils: Pupils are equal, round, and reactive to light.      Comments:      Neck:      Thyroid: No thyroid mass or thyromegaly.      Vascular: No carotid bruit.   Cardiovascular:      Rate and Rhythm: Normal rate and regular rhythm.      Heart sounds: No murmur heard.     No friction rub.   Pulmonary:      Effort: Pulmonary effort is normal.      Breath sounds: Normal breath sounds.   Abdominal:      General: Bowel sounds are normal. There is no abdominal bruit.      Palpations: Abdomen is soft. There is no mass.      Tenderness: There is no abdominal tenderness.   Musculoskeletal:         General: Normal range of motion.      Cervical back: Normal range of motion and neck supple.   Lymphadenopathy:      Cervical: No cervical adenopathy.      Upper Body:      Right upper body: No supraclavicular adenopathy.      Left upper body: No supraclavicular adenopathy.   Skin:     General: Skin is warm and dry.      Findings: No rash.      Nails: There is no clubbing.      Comments: No suspicious nevi on clothed exam.    Neurological:      Mental Status: He is alert.      Gait: Gait normal.      Deep Tendon Reflexes: Reflexes normal.    Psychiatric:         Speech: Speech normal.         Behavior: Behavior normal.         A/P    Problem List Items Addressed This Visit          Mental Health    Bipolar II disorder, most recent episode hypomanic    Relevant Medications    lamoTRIgine (LaMICtal) 200 MG tablet     Other Visit Diagnoses       Healthcare maintenance    -  Primary    Relevant Orders    CBC (No Diff)    Comprehensive Metabolic Panel    Lipid Panel    TSH Rfx On Abnormal To Free T4    Hepatitis C Antibody    Vapes nicotine containing substance              -Counseled patient regarding cancer screening immunizations, healthy lifestyle, diet, maintaining a healthy weight, and exercise.   -Annual dental and eye exams were encouraged.   -Check screening labs as ordered.   -Counseled to quit vaping. He understands that this may not be any safer than cigarette smoking.   -RTC in 1 year for f/u with mckenna Carreon.     Plan of care was reviewed with patient at the conclusion of today's visit. Education was provided regarding diagnoses, management, treatment plan, and the importance of keeping follow-up appointments. The patient was counseled regarding the risks, benefits, and possible side-effects of treatment. Patient and/or family expresses understanding and agreement with the management plan.        Jesse Antunez PA-C

## 2024-12-31 DIAGNOSIS — F31.81 BIPOLAR II DISORDER, MOST RECENT EPISODE HYPOMANIC: ICD-10-CM

## 2024-12-31 RX ORDER — LAMOTRIGINE 200 MG/1
200 TABLET ORAL DAILY
Qty: 90 TABLET | Refills: 0 | Status: SHIPPED | OUTPATIENT
Start: 2024-12-31

## 2025-03-31 DIAGNOSIS — F31.81 BIPOLAR II DISORDER, MOST RECENT EPISODE HYPOMANIC: ICD-10-CM

## 2025-03-31 NOTE — TELEPHONE ENCOUNTER
Rx Refill Note  Requested Prescriptions     Pending Prescriptions Disp Refills    lamoTRIgine (LaMICtal) 200 MG tablet [Pharmacy Med Name: lamoTRIgine 200 MG TABLET] 90 tablet 0     Sig: TAKE 1 TABLET BY MOUTH DAILY      Last office visit with prescribing clinician: Visit date not found   Last telemedicine visit with prescribing clinician: Visit date not found   Next office visit with prescribing clinician: Visit date not found     Emma Pearson MA  03/31/25, 10:55 EDT

## 2025-04-01 RX ORDER — LAMOTRIGINE 200 MG/1
200 TABLET ORAL DAILY
Qty: 90 TABLET | Refills: 0 | Status: SHIPPED | OUTPATIENT
Start: 2025-04-01

## 2025-07-08 DIAGNOSIS — F31.81 BIPOLAR II DISORDER, MOST RECENT EPISODE HYPOMANIC: ICD-10-CM

## 2025-07-09 RX ORDER — LAMOTRIGINE 200 MG/1
200 TABLET ORAL DAILY
Qty: 30 TABLET | Refills: 0 | Status: SHIPPED | OUTPATIENT
Start: 2025-07-09

## 2025-08-17 DIAGNOSIS — F31.81 BIPOLAR II DISORDER, MOST RECENT EPISODE HYPOMANIC: ICD-10-CM

## 2025-08-18 RX ORDER — LAMOTRIGINE 200 MG/1
200 TABLET ORAL DAILY
Qty: 30 TABLET | Refills: 0 | Status: SHIPPED | OUTPATIENT
Start: 2025-08-18